# Patient Record
Sex: FEMALE | Race: WHITE | NOT HISPANIC OR LATINO | URBAN - METROPOLITAN AREA
[De-identification: names, ages, dates, MRNs, and addresses within clinical notes are randomized per-mention and may not be internally consistent; named-entity substitution may affect disease eponyms.]

---

## 2020-06-10 ENCOUNTER — LAB OUTSIDE AN ENCOUNTER (OUTPATIENT)
Dept: URBAN - METROPOLITAN AREA CLINIC 19 | Facility: CLINIC | Age: 65
End: 2020-06-10

## 2021-03-19 ENCOUNTER — ERX REFILL RESPONSE (OUTPATIENT)
Dept: URBAN - METROPOLITAN AREA CLINIC 19 | Facility: CLINIC | Age: 66
End: 2021-03-19

## 2021-03-19 RX ORDER — PANTOPRAZOLE SODIUM 40 MG/1
TAKE 1 TABLET (40 MG) BY ORAL ROUTE ONCE DAILY FOR 90 DAYS TABLET, DELAYED RELEASE ORAL
Qty: 90 | Refills: 3

## 2021-05-21 ENCOUNTER — TELEPHONE ENCOUNTER (OUTPATIENT)
Dept: URBAN - METROPOLITAN AREA CLINIC 19 | Facility: CLINIC | Age: 66
End: 2021-05-21

## 2021-05-21 RX ORDER — PANTOPRAZOLE SODIUM 40 MG/1
TAKE 1 TABLET (40 MG) BY ORAL ROUTE ONCE DAILY FOR 90 DAYS TABLET, DELAYED RELEASE ORAL
Qty: 90 | Refills: 3

## 2021-05-21 RX ORDER — FUROSEMIDE 40 MG/1
TAKE ONE TABLET BY MOUTH DAILY FOR 90 DAYS TABLET ORAL
Qty: 90 | Refills: 3
Start: 2020-05-04

## 2021-05-21 RX ORDER — SPIRONOLACTONE 100 MG/1
TAKE ONE TABLET BY MOUTH DAILY FOR 90 DAYS FOR 90 DAYS TABLET, FILM COATED ORAL
Qty: 90 | Refills: 3
Start: 2020-05-04

## 2021-07-30 ENCOUNTER — OFFICE VISIT (OUTPATIENT)
Dept: URBAN - METROPOLITAN AREA CLINIC 19 | Facility: CLINIC | Age: 66
End: 2021-07-30
Payer: MEDICARE

## 2021-07-30 ENCOUNTER — WEB ENCOUNTER (OUTPATIENT)
Dept: URBAN - METROPOLITAN AREA CLINIC 19 | Facility: CLINIC | Age: 66
End: 2021-07-30

## 2021-07-30 DIAGNOSIS — K70.30 ALCOHOLIC CIRRHOSIS: ICD-10-CM

## 2021-07-30 DIAGNOSIS — R18.8 ASCITES: ICD-10-CM

## 2021-07-30 DIAGNOSIS — Z86.010 HISTORY OF COLON POLYPS: ICD-10-CM

## 2021-07-30 PROCEDURE — 99214 OFFICE O/P EST MOD 30 MIN: CPT | Performed by: INTERNAL MEDICINE

## 2021-07-30 RX ORDER — GABAPENTIN 300 MG/1
TAKE 1 CAPSULE (300 MG) BY ORAL ROUTE 3 TIMES PER DAY CAPSULE ORAL
Qty: 0 | Refills: 0 | Status: ACTIVE | COMMUNITY
Start: 1900-01-01

## 2021-07-30 RX ORDER — QUETIAPINE FUMARATE 25 MG/1
TABLET, FILM COATED ORAL
Qty: 0 | Refills: 0 | Status: ACTIVE | COMMUNITY
Start: 1900-01-01

## 2021-07-30 RX ORDER — FUROSEMIDE 40 MG/1
TAKE ONE TABLET BY MOUTH DAILY FOR 90 DAYS TABLET ORAL ONCE A DAY
Qty: 90 | Refills: 3

## 2021-07-30 RX ORDER — HYDROCHLOROTHIAZIDE 25 MG/1
TAKE 1 TABLET (25 MG) BY ORAL ROUTE ONCE DAILY TABLET ORAL 1
Qty: 0 | Refills: 0 | Status: ACTIVE | COMMUNITY
Start: 1900-01-01

## 2021-07-30 RX ORDER — SPIRONOLACTONE 100 MG/1
TAKE ONE TABLET BY MOUTH DAILY FOR 90 DAYS FOR 90 DAYS TABLET, FILM COATED ORAL
Qty: 90 | Refills: 3 | Status: ACTIVE | COMMUNITY
Start: 2020-05-04

## 2021-07-30 RX ORDER — PANTOPRAZOLE SODIUM 40 MG/1
TAKE 1 TABLET (40 MG) BY ORAL ROUTE ONCE DAILY FOR 90 DAYS TABLET, DELAYED RELEASE ORAL
Qty: 90 | Refills: 3 | Status: ACTIVE | COMMUNITY

## 2021-07-30 RX ORDER — SPIRONOLACTONE 100 MG/1
TAKE ONE TABLET BY MOUTH DAILY FOR 90 DAYS FOR 90 DAYS TABLET, FILM COATED ORAL ONCE A DAY
Qty: 90 | Refills: 3

## 2021-07-30 RX ORDER — FLUTICASONE PROPIONATE 50 UG/1
SPRAY, METERED NASAL
Qty: 0 | Refills: 0 | Status: ACTIVE | COMMUNITY
Start: 1900-01-01

## 2021-07-30 RX ORDER — FUROSEMIDE 40 MG/1
TAKE ONE TABLET BY MOUTH DAILY FOR 90 DAYS TABLET ORAL
Qty: 90 | Refills: 3 | Status: ACTIVE | COMMUNITY
Start: 2020-05-04

## 2021-07-30 RX ORDER — TRAVOPROST 0.04 MG/ML
SOLUTION/ DROPS OPHTHALMIC
Qty: 0 | Refills: 0 | Status: ACTIVE | COMMUNITY
Start: 1900-01-01

## 2021-07-30 RX ORDER — ALBUTEROL SULFATE 108 UG/1
AEROSOL, METERED RESPIRATORY (INHALATION)
Qty: 0 | Refills: 0 | Status: ACTIVE | COMMUNITY
Start: 1900-01-01

## 2021-07-30 NOTE — HPI-TODAY'S VISIT:
10/13/16: Maddie Lisa returns to my clinic for reevalation of her liver issues. Due to concerns about liver cancer, she had a CT-guided liver biopsy performed on 8/29/16 that revealed fragments of benign liver with cirrhosis. I discussed with her the results of her biopsy and that the "masses" seen on imaging could be regenerative nodules, which are benign. She has had multiple paracenteses - she reports that she has had 3 in total. She complains of abdominal cramping. I discussed the results with her and the need to stop drinking alcohol. She states that she uses alcohol to relieve her abdominal pain; she has no interest in stopping at this time.  8/31/17: Patient returns after a long hiatus. She appears to have got her life together - she has been sober now for 6 months. 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 She has not given up smoking yet. Her only problem today is mild abdominal swelling. Her PCP agreed to give her a rx for spironolactone 50/furosemide 20. This is not a therapeutic dose. She needs HCC and variceal screening.  10/1/18: Patient returns for reevaluation - another year has gone by. Labs were unremarkable when they were drawn at last visit. RUQ ultrasound showed marked hepatomegaly but no cirrhosis. However, she had a prior liver biopsy that did show evidence of cirrhosis. She states that she takes her medications, avoids salt, and has not had any alcohol in a year. Complains of constipation followed by loose stools.  1/25/19: Patient returns for reevaluation of her biopsy-proven alcoholic cirrhosis. Patient has been sober for a year now, and she is doing very well. No complaints except sciatica. Spent a long time talking about her eye drops (for glaucoma) - sometimes they make her nauseated.  7/9/19: Patient presents for follow-up after recent [facility] hospitalization (5/21/19 - 5/25/19) for an upper GI bleed. She had actually come to the ER earlier on the morning of 5/21/19 for chest pain/muscle spasm and discharged, but her , Gwendolyn Childress, visited her later that day and told her to come back to the ER because she was vomiting blood. She had been having intractable nausea/vomiting with coffee-ground emesis. My colleague, Dr. Denver Dupree, performed her EGD on admission and found small esophageal varices and hematin. Repeat EGD found gastritis - she had been taking large amount of naproxen for chronic pain. Her liver function (based on labs and clinical exam) appears to be stable. No further bleeding since stopping all NSAIDs and taking Protonix. She maintains that she is still abstinent from alcohol. She has had some discomfort around her umbilicus with an obvious umbilical hernia.  11/8/19: Patient presents for reevaluation of her alcoholic cirrhosis. Doing well. On furosemide 40/spironolactone 100 daily. Had umbilical hernia repaired. No signs of bleeding. Minimal abdominal distension. Avoiding alcohol.  5/4/20: Patient presents for reevaluation of her alcoholic cirrhosis. Still on same dose of diuretics. Abstinent from alcohol. Due for HCC screening in July 2020. Last EGD was in May 2019 (see above). Had to have umbilical hernia repair revised - did well from a surgical standpoint but had a reaction to anesthesia. Protonix working well for heartburn.  7/30/21:  Patient returns for reevaluation of her alcoholic cirrhosis - she has maintained sobriety.  However, she is not always compliant with low sodium diet and diuretics, and she noticed recently that she was leaking at her umbilicus where she had prior surgery and revision.  She saw Dr. Worrell who told her that she needed to go back on her diuretics.  She has done well since then.  She is due for HCC surveillance and variceal surveillance.  Of note, her last RUQ ultrasound in June 2020 showed a right renal mass - she has since gone to a urologist who did further testing.  She was told that it was benign.

## 2022-03-16 ENCOUNTER — WEB ENCOUNTER (OUTPATIENT)
Dept: URBAN - METROPOLITAN AREA CLINIC 19 | Facility: CLINIC | Age: 67
End: 2022-03-16

## 2022-03-16 ENCOUNTER — LAB OUTSIDE AN ENCOUNTER (OUTPATIENT)
Dept: URBAN - METROPOLITAN AREA CLINIC 19 | Facility: CLINIC | Age: 67
End: 2022-03-16

## 2022-03-16 ENCOUNTER — OFFICE VISIT (OUTPATIENT)
Dept: URBAN - METROPOLITAN AREA CLINIC 19 | Facility: CLINIC | Age: 67
End: 2022-03-16
Payer: MEDICARE

## 2022-03-16 DIAGNOSIS — R11.0 NAUSEA: ICD-10-CM

## 2022-03-16 DIAGNOSIS — K70.30 ALCOHOLIC CIRRHOSIS: ICD-10-CM

## 2022-03-16 PROCEDURE — 99214 OFFICE O/P EST MOD 30 MIN: CPT | Performed by: NURSE PRACTITIONER

## 2022-03-16 RX ORDER — TRAVOPROST 0.04 MG/ML
SOLUTION/ DROPS OPHTHALMIC
Qty: 0 | Refills: 0 | Status: ACTIVE | COMMUNITY
Start: 1900-01-01

## 2022-03-16 RX ORDER — FUROSEMIDE 40 MG/1
TAKE ONE TABLET BY MOUTH DAILY FOR 90 DAYS TABLET ORAL ONCE A DAY
Qty: 90 | Refills: 3 | Status: ACTIVE | COMMUNITY

## 2022-03-16 RX ORDER — QUETIAPINE FUMARATE 25 MG/1
TABLET, FILM COATED ORAL
Qty: 0 | Refills: 0 | Status: ACTIVE | COMMUNITY
Start: 1900-01-01

## 2022-03-16 RX ORDER — FLUTICASONE PROPIONATE 50 UG/1
SPRAY, METERED NASAL
Qty: 0 | Refills: 0 | Status: ACTIVE | COMMUNITY
Start: 1900-01-01

## 2022-03-16 RX ORDER — ALBUTEROL SULFATE 108 UG/1
AEROSOL, METERED RESPIRATORY (INHALATION)
Qty: 0 | Refills: 0 | Status: ACTIVE | COMMUNITY
Start: 1900-01-01

## 2022-03-16 RX ORDER — PANTOPRAZOLE SODIUM 40 MG/1
TAKE 1 TABLET (40 MG) BY ORAL ROUTE ONCE DAILY FOR 90 DAYS TABLET, DELAYED RELEASE ORAL
Qty: 90 | Refills: 3 | Status: ACTIVE | COMMUNITY

## 2022-03-16 RX ORDER — SPIRONOLACTONE 100 MG/1
TAKE ONE TABLET BY MOUTH DAILY FOR 90 DAYS FOR 90 DAYS TABLET, FILM COATED ORAL ONCE A DAY
Qty: 90 | Refills: 3 | Status: ACTIVE | COMMUNITY

## 2022-03-16 RX ORDER — ONDANSETRON 4 MG/1
1 TABLET ON THE TONGUE AND ALLOW TO DISSOLVE TABLET, ORALLY DISINTEGRATING ORAL ONCE A DAY
Qty: 10 TABLET | Refills: 1 | OUTPATIENT
Start: 2022-03-16

## 2022-03-16 RX ORDER — HYDROCHLOROTHIAZIDE 25 MG/1
TAKE 1 TABLET (25 MG) BY ORAL ROUTE ONCE DAILY TABLET ORAL 1
Qty: 0 | Refills: 0 | Status: ACTIVE | COMMUNITY
Start: 1900-01-01

## 2022-03-16 RX ORDER — GABAPENTIN 300 MG/1
TAKE 1 CAPSULE (300 MG) BY ORAL ROUTE 3 TIMES PER DAY CAPSULE ORAL
Qty: 0 | Refills: 0 | Status: ACTIVE | COMMUNITY
Start: 1900-01-01

## 2022-03-16 NOTE — HPI-TODAY'S VISIT:
10/13/16: Maddie Lisa returns to my clinic for reevalation of her liver issues. Due to concerns about liver cancer, she had a CT-guided liver biopsy performed on 8/29/16 that revealed fragments of benign liver with cirrhosis. I discussed with her the results of her biopsy and that the "masses" seen on imaging could be regenerative nodules, which are benign. She has had multiple paracenteses - she reports that she has had 3 in total. She complains of abdominal cramping. I discussed the results with her and the need to stop drinking alcohol. She states that she uses alcohol to relieve her abdominal pain; she has no interest in stopping at this time.  8/31/17: Patient returns after a long hiatus. She appears to have got her life together - she has been sober now for 6 months. 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 She has not given up smoking yet. Her only problem today is mild abdominal swelling. Her PCP agreed to give her a rx for spironolactone 50/furosemide 20. This is not a therapeutic dose. She needs HCC and variceal screening.  10/1/18: Patient returns for reevaluation - another year has gone by. Labs were unremarkable when they were drawn at last visit. RUQ ultrasound showed marked hepatomegaly but no cirrhosis. However, she had a prior liver biopsy that did show evidence of cirrhosis. She states that she takes her medications, avoids salt, and has not had any alcohol in a year. Complains of constipation followed by loose stools.  1/25/19: Patient returns for reevaluation of her biopsy-proven alcoholic cirrhosis. Patient has been sober for a year now, and she is doing very well. No complaints except sciatica. Spent a long time talking about her eye drops (for glaucoma) - sometimes they make her nauseated.  7/9/19: Patient presents for follow-up after recent [facility] hospitalization (5/21/19 - 5/25/19) for an upper GI bleed. She had actually come to the ER earlier on the morning of 5/21/19 for chest pain/muscle spasm and discharged, but her , Gwendolyn Childress, visited her later that day and told her to come back to the ER because she was vomiting blood. She had been having intractable nausea/vomiting with coffee-ground emesis. My colleague, Dr. Denver Dupree, performed her EGD on admission and found small esophageal varices and hematin. Repeat EGD found gastritis - she had been taking large amount of naproxen for chronic pain. Her liver function (based on labs and clinical exam) appears to be stable. No further bleeding since stopping all NSAIDs and taking Protonix. She maintains that she is still abstinent from alcohol. She has had some discomfort around her umbilicus with an obvious umbilical hernia.  11/8/19: Patient presents for reevaluation of her alcoholic cirrhosis. Doing well. On furosemide 40/spironolactone 100 daily. Had umbilical hernia repaired. No signs of bleeding. Minimal abdominal distension. Avoiding alcohol.  5/4/20: Patient presents for reevaluation of her alcoholic cirrhosis. Still on same dose of diuretics. Abstinent from alcohol. Due for HCC screening in July 2020. Last EGD was in May 2019 (see above). Had to have umbilical hernia repair revised - did well from a surgical standpoint but had a reaction to anesthesia. Protonix working well for heartburn.  7/30/21:  Patient returns for reevaluation of her alcoholic cirrhosis - she has maintained sobriety.  However, she is not always compliant with low sodium diet and diuretics, and she noticed recently that she was leaking at her umbilicus where she had prior surgery and revision.  She saw Dr. Worrell who told her that she needed to go back on her diuretics.  She has done well since then.  She is due for HCC surveillance and variceal surveillance.  Of note, her last RUQ ultrasound in June 2020 showed a right renal mass - she has since gone to a urologist who did further testing.  She was told that it was benign.  3/16/22: Ms. Lisa is a 67-year-old female who presents today for ascites.  [facility] for nausea x1 month with vomiting.  Labs- WBC 6.41, Hgb 13.3, HCT 39.1, MCV 92.4, lipase 60, bilirubin 2.6, alk phos 122, , ALT 28, sodium 136.  CT ab/pelvis with IV contrast-suspected abscess in the periumbilical hernia.  Stigmata of cirrhosis with portal hypertension.  There are innumerable nodules noted throughout the liver which are not well assessed by CT.  Further assessment with nonemergent MRI of abdomen with and without contrast is suggested.  Colonic and duodenal diverticulosis.  Left renal stone.  General surgery was consulted for periumbilical abscess.  Patient left AMA.  Today, she reports her psychiatric medications were being adjusted.  At that time, she was having nausea and vomiting.  She reports that she was unable to take her diuretics.  Nausea and vomiting resolved a couple of weeks ago now, but she is requesting zofran. Patient is taking furosemide 40 mg daily and spironolactone 100 mg daily now.  She reports her ascites began last week.  She reports she is occasionally drinking alcohol now.  She reports she went a whole year without drinking alcohol.  She reports she is not following a low-sodium diet.  She denies jaundice, confusion, and lower extremity edema.  She denies cardiac/kidney disease, diabetes, blood thinners, and home O2.  Patient does have COPD, but does not see a pulmonologist.  Patient reports she did not do RUQ ultrasound, EGD, or labs Dr. Singleton ordered because she was doing well.

## 2022-03-16 NOTE — PHYSICAL EXAM GASTROINTESTINAL
Abdomen, soft, nontender, distended, no guarding or rigidity, no masses palpable, normal bowel sounds, Liver and Spleen, no hepatomegaly present, no hepatosplenomegaly,  Rectal, deferred

## 2022-03-17 LAB
A/G RATIO: 0.9
AFP, SERUM, TUMOR MARKER: 5.5
ALBUMIN: 3.5
ALKALINE PHOSPHATASE: 138
ALT (SGPT): 33
AST (SGOT): 76
BILIRUBIN, TOTAL: 2.3
BUN/CREATININE RATIO: 9
BUN: 6
CALCIUM: 8.7
CARBON DIOXIDE, TOTAL: 31
CHLORIDE: 90
CREATININE: 0.64
EGFR: 97
GLOBULIN, TOTAL: 4
GLUCOSE: 93
HEMATOCRIT: 40.3
HEMOGLOBIN: 13.4
INR: 1.2
MCH: 31
MCHC: 33.3
MCV: 93
NRBC: (no result)
PLATELETS: 245
POTASSIUM: (no result)
PROTEIN, TOTAL: 7.5
PROTHROMBIN TIME: 12.9
RBC: 4.32
RDW: 12.9
SODIUM: 140
WBC: 7.1

## 2022-03-21 ENCOUNTER — TELEPHONE ENCOUNTER (OUTPATIENT)
Dept: URBAN - METROPOLITAN AREA CLINIC 19 | Facility: CLINIC | Age: 67
End: 2022-03-21

## 2022-03-22 ENCOUNTER — TELEPHONE ENCOUNTER (OUTPATIENT)
Dept: URBAN - METROPOLITAN AREA CLINIC 19 | Facility: CLINIC | Age: 67
End: 2022-03-22

## 2022-04-18 ENCOUNTER — TELEPHONE ENCOUNTER (OUTPATIENT)
Dept: URBAN - METROPOLITAN AREA CLINIC 19 | Facility: CLINIC | Age: 67
End: 2022-04-18

## 2022-04-22 ENCOUNTER — ERX REFILL RESPONSE (OUTPATIENT)
Dept: URBAN - METROPOLITAN AREA CLINIC 19 | Facility: CLINIC | Age: 67
End: 2022-04-22

## 2022-04-22 RX ORDER — ONDANSETRON 4 MG/1
1 TABLET ON THE TONGUE AND ALLOW TO DISSOLVE TABLET, ORALLY DISINTEGRATING ORAL ONCE A DAY
Qty: 10 TABLET | Refills: 1 | OUTPATIENT

## 2022-04-22 RX ORDER — ONDANSETRON 4 MG/1
1 TABLET ON THE TONGUE AND ALLOW TO DISSOLVE ORALLY ONCE A DAY 10 DAYS TABLET, ORALLY DISINTEGRATING ORAL
Qty: 10 TABLET | Refills: 5 | OUTPATIENT

## 2022-05-13 ENCOUNTER — TELEPHONE ENCOUNTER (OUTPATIENT)
Dept: URBAN - METROPOLITAN AREA CLINIC 19 | Facility: CLINIC | Age: 67
End: 2022-05-13

## 2022-05-13 ENCOUNTER — OFFICE VISIT (OUTPATIENT)
Dept: URBAN - METROPOLITAN AREA MEDICAL CENTER 25 | Facility: MEDICAL CENTER | Age: 67
End: 2022-05-13
Payer: MEDICARE

## 2022-05-13 DIAGNOSIS — K74.60 ADVANCED CIRRHOSIS: ICD-10-CM

## 2022-05-13 DIAGNOSIS — K29.60 ADENOPAPILLOMATOSIS GASTRICA: ICD-10-CM

## 2022-05-13 DIAGNOSIS — I85.10 ESOPH VARICE OTHER DIS: ICD-10-CM

## 2022-05-13 PROCEDURE — 43244 EGD VARICES LIGATION: CPT | Performed by: INTERNAL MEDICINE

## 2022-05-13 PROCEDURE — 43251 EGD REMOVE LESION SNARE: CPT | Performed by: INTERNAL MEDICINE

## 2022-05-13 RX ORDER — PANTOPRAZOLE SODIUM 40 MG/1
TAKE 1 TABLET (40 MG) BY ORAL ROUTE ONCE DAILY FOR 90 DAYS TABLET, DELAYED RELEASE ORAL
Qty: 90 | Refills: 3 | Status: ACTIVE | COMMUNITY

## 2022-05-13 RX ORDER — FLUTICASONE PROPIONATE 50 UG/1
SPRAY, METERED NASAL
Qty: 0 | Refills: 0 | Status: ACTIVE | COMMUNITY
Start: 1900-01-01

## 2022-05-13 RX ORDER — QUETIAPINE FUMARATE 25 MG/1
TABLET, FILM COATED ORAL
Qty: 0 | Refills: 0 | Status: ACTIVE | COMMUNITY
Start: 1900-01-01

## 2022-05-13 RX ORDER — HYDROCHLOROTHIAZIDE 25 MG/1
TAKE 1 TABLET (25 MG) BY ORAL ROUTE ONCE DAILY TABLET ORAL 1
Qty: 0 | Refills: 0 | Status: ACTIVE | COMMUNITY
Start: 1900-01-01

## 2022-05-13 RX ORDER — GABAPENTIN 300 MG/1
TAKE 1 CAPSULE (300 MG) BY ORAL ROUTE 3 TIMES PER DAY CAPSULE ORAL
Qty: 0 | Refills: 0 | Status: ACTIVE | COMMUNITY
Start: 1900-01-01

## 2022-05-13 RX ORDER — ALBUTEROL SULFATE 108 UG/1
AEROSOL, METERED RESPIRATORY (INHALATION)
Qty: 0 | Refills: 0 | Status: ACTIVE | COMMUNITY
Start: 1900-01-01

## 2022-05-13 RX ORDER — SPIRONOLACTONE 100 MG/1
TAKE ONE TABLET BY MOUTH DAILY FOR 90 DAYS FOR 90 DAYS TABLET, FILM COATED ORAL ONCE A DAY
Qty: 90 | Refills: 3 | Status: ACTIVE | COMMUNITY

## 2022-05-13 RX ORDER — ONDANSETRON 4 MG/1
1 TABLET ON THE TONGUE AND ALLOW TO DISSOLVE ORALLY ONCE A DAY 10 DAYS TABLET, ORALLY DISINTEGRATING ORAL
Qty: 10 TABLET | Refills: 5 | Status: ACTIVE | COMMUNITY

## 2022-05-13 RX ORDER — FUROSEMIDE 40 MG/1
TAKE ONE TABLET BY MOUTH DAILY FOR 90 DAYS TABLET ORAL ONCE A DAY
Qty: 90 | Refills: 3 | Status: ACTIVE | COMMUNITY

## 2022-05-13 RX ORDER — TRAVOPROST 0.04 MG/ML
SOLUTION/ DROPS OPHTHALMIC
Qty: 0 | Refills: 0 | Status: ACTIVE | COMMUNITY
Start: 1900-01-01

## 2022-06-03 ENCOUNTER — ERX REFILL RESPONSE (OUTPATIENT)
Dept: URBAN - METROPOLITAN AREA CLINIC 19 | Facility: CLINIC | Age: 67
End: 2022-06-03

## 2022-06-03 RX ORDER — PANTOPRAZOLE SODIUM 40 MG/1
TAKE 1 TABLET (40 MG) BY ORAL ROUTE ONCE DAILY FOR 90 DAYS TABLET, DELAYED RELEASE ORAL
Qty: 90 | Refills: 3 | OUTPATIENT

## 2022-06-03 RX ORDER — PANTOPRAZOLE SODIUM 40 MG/1
TAKE ONE TABLET BY MOUTH ONCE DAILY FOR 90 DAYS TABLET, DELAYED RELEASE ORAL
Qty: 90 TABLET | Refills: 4 | OUTPATIENT

## 2022-07-08 ENCOUNTER — OFFICE VISIT (OUTPATIENT)
Dept: URBAN - METROPOLITAN AREA MEDICAL CENTER 25 | Facility: MEDICAL CENTER | Age: 67
End: 2022-07-08
Payer: MEDICARE

## 2022-07-08 DIAGNOSIS — K74.60 ADVANCED CIRRHOSIS: ICD-10-CM

## 2022-07-08 DIAGNOSIS — I85.10 ESOPH VARICE OTHER DIS: ICD-10-CM

## 2022-07-08 PROCEDURE — 43235 EGD DIAGNOSTIC BRUSH WASH: CPT | Performed by: INTERNAL MEDICINE

## 2022-07-08 RX ORDER — PANTOPRAZOLE SODIUM 40 MG/1
TAKE ONE TABLET BY MOUTH ONCE DAILY FOR 90 DAYS TABLET, DELAYED RELEASE ORAL
Qty: 90 TABLET | Refills: 4 | Status: ACTIVE | COMMUNITY

## 2022-07-08 RX ORDER — SPIRONOLACTONE 100 MG/1
TAKE ONE TABLET BY MOUTH DAILY FOR 90 DAYS FOR 90 DAYS TABLET, FILM COATED ORAL ONCE A DAY
Qty: 90 | Refills: 3 | Status: ACTIVE | COMMUNITY

## 2022-07-08 RX ORDER — FUROSEMIDE 40 MG/1
TAKE ONE TABLET BY MOUTH DAILY FOR 90 DAYS TABLET ORAL ONCE A DAY
Qty: 90 | Refills: 3 | Status: ACTIVE | COMMUNITY

## 2022-07-08 RX ORDER — QUETIAPINE FUMARATE 25 MG/1
TABLET, FILM COATED ORAL
Qty: 0 | Refills: 0 | Status: ACTIVE | COMMUNITY
Start: 1900-01-01

## 2022-07-08 RX ORDER — GABAPENTIN 300 MG/1
TAKE 1 CAPSULE (300 MG) BY ORAL ROUTE 3 TIMES PER DAY CAPSULE ORAL
Qty: 0 | Refills: 0 | Status: ACTIVE | COMMUNITY
Start: 1900-01-01

## 2022-07-08 RX ORDER — ONDANSETRON 4 MG/1
1 TABLET ON THE TONGUE AND ALLOW TO DISSOLVE ORALLY ONCE A DAY 10 DAYS TABLET, ORALLY DISINTEGRATING ORAL
Qty: 10 TABLET | Refills: 5 | Status: ACTIVE | COMMUNITY

## 2022-07-08 RX ORDER — FLUTICASONE PROPIONATE 50 UG/1
SPRAY, METERED NASAL
Qty: 0 | Refills: 0 | Status: ACTIVE | COMMUNITY
Start: 1900-01-01

## 2022-07-08 RX ORDER — TRAVOPROST 0.04 MG/ML
SOLUTION/ DROPS OPHTHALMIC
Qty: 0 | Refills: 0 | Status: ACTIVE | COMMUNITY
Start: 1900-01-01

## 2022-07-08 RX ORDER — HYDROCHLOROTHIAZIDE 25 MG/1
TAKE 1 TABLET (25 MG) BY ORAL ROUTE ONCE DAILY TABLET ORAL 1
Qty: 0 | Refills: 0 | Status: ACTIVE | COMMUNITY
Start: 1900-01-01

## 2022-07-08 RX ORDER — ALBUTEROL SULFATE 108 UG/1
AEROSOL, METERED RESPIRATORY (INHALATION)
Qty: 0 | Refills: 0 | Status: ACTIVE | COMMUNITY
Start: 1900-01-01

## 2022-11-03 ENCOUNTER — TELEPHONE ENCOUNTER (OUTPATIENT)
Dept: URBAN - METROPOLITAN AREA CLINIC 19 | Facility: CLINIC | Age: 67
End: 2022-11-03

## 2022-11-23 ENCOUNTER — TELEPHONE ENCOUNTER (OUTPATIENT)
Dept: URBAN - METROPOLITAN AREA CLINIC 128 | Facility: CLINIC | Age: 67
End: 2022-11-23

## 2022-12-02 ENCOUNTER — OFFICE VISIT (OUTPATIENT)
Dept: URBAN - METROPOLITAN AREA CLINIC 19 | Facility: CLINIC | Age: 67
End: 2022-12-02
Payer: MEDICARE

## 2022-12-02 VITALS
SYSTOLIC BLOOD PRESSURE: 132 MMHG | TEMPERATURE: 98.4 F | WEIGHT: 129.2 LBS | BODY MASS INDEX: 26.04 KG/M2 | DIASTOLIC BLOOD PRESSURE: 74 MMHG | HEIGHT: 59 IN

## 2022-12-02 DIAGNOSIS — K30 FUNCTIONAL DYSPEPSIA: ICD-10-CM

## 2022-12-02 DIAGNOSIS — R13.12 OROPHARYNGEAL DYSPHAGIA: ICD-10-CM

## 2022-12-02 DIAGNOSIS — K70.30 ALCOHOLIC CIRRHOSIS: ICD-10-CM

## 2022-12-02 DIAGNOSIS — Z86.010 HISTORY OF COLON POLYPS: ICD-10-CM

## 2022-12-02 DIAGNOSIS — R18.8 ASCITES: ICD-10-CM

## 2022-12-02 DIAGNOSIS — I85.00 ESOPHAGEAL VARICES: ICD-10-CM

## 2022-12-02 PROBLEM — 3696007 FUNCTIONAL DYSPEPSIA: Status: ACTIVE | Noted: 2022-12-02

## 2022-12-02 PROCEDURE — 99214 OFFICE O/P EST MOD 30 MIN: CPT | Performed by: INTERNAL MEDICINE

## 2022-12-02 RX ORDER — FUROSEMIDE 40 MG/1
TAKE ONE TABLET BY MOUTH DAILY FOR 90 DAYS TABLET ORAL ONCE A DAY
Qty: 90 | Refills: 3

## 2022-12-02 RX ORDER — ONDANSETRON 4 MG/1
1 TABLET ON THE TONGUE AND ALLOW TO DISSOLVE ORALLY ONCE A DAY 10 DAYS TABLET, ORALLY DISINTEGRATING ORAL
Qty: 10 TABLET | Refills: 5 | Status: ACTIVE | COMMUNITY

## 2022-12-02 RX ORDER — SPIRONOLACTONE 100 MG/1
TAKE ONE TABLET BY MOUTH DAILY FOR 90 DAYS FOR 90 DAYS TABLET, FILM COATED ORAL ONCE A DAY
Qty: 90 | Refills: 3

## 2022-12-02 RX ORDER — HYDROCHLOROTHIAZIDE 25 MG/1
TAKE 1 TABLET (25 MG) BY ORAL ROUTE ONCE DAILY TABLET ORAL 1
Qty: 0 | Refills: 0 | Status: ACTIVE | COMMUNITY
Start: 1900-01-01

## 2022-12-02 RX ORDER — SPIRONOLACTONE 100 MG/1
TAKE ONE TABLET BY MOUTH DAILY FOR 90 DAYS FOR 90 DAYS TABLET, FILM COATED ORAL ONCE A DAY
Qty: 90 | Refills: 3 | Status: ACTIVE | COMMUNITY

## 2022-12-02 RX ORDER — PANTOPRAZOLE SODIUM 40 MG/1
TAKE ONE TABLET BY MOUTH ONCE DAILY FOR 90 DAYS TABLET, DELAYED RELEASE ORAL
Qty: 90 TABLET | Refills: 4 | Status: ACTIVE | COMMUNITY

## 2022-12-02 RX ORDER — PANTOPRAZOLE SODIUM 40 MG/1
1 TABLET TABLET, DELAYED RELEASE ORAL ONCE A DAY
Qty: 90 TABLET | Refills: 3 | OUTPATIENT
Start: 2022-12-02

## 2022-12-02 RX ORDER — FUROSEMIDE 40 MG/1
TAKE ONE TABLET BY MOUTH DAILY FOR 90 DAYS TABLET ORAL ONCE A DAY
Qty: 90 | Refills: 3 | Status: ACTIVE | COMMUNITY

## 2022-12-02 RX ORDER — FLUTICASONE PROPIONATE 50 UG/1
SPRAY, METERED NASAL
Qty: 0 | Refills: 0 | Status: ACTIVE | COMMUNITY
Start: 1900-01-01

## 2022-12-02 RX ORDER — GABAPENTIN 300 MG/1
TAKE 1 CAPSULE (300 MG) BY ORAL ROUTE 3 TIMES PER DAY CAPSULE ORAL
Qty: 0 | Refills: 0 | Status: ACTIVE | COMMUNITY
Start: 1900-01-01

## 2022-12-02 RX ORDER — TRAVOPROST 0.04 MG/ML
SOLUTION/ DROPS OPHTHALMIC
Qty: 0 | Refills: 0 | Status: ACTIVE | COMMUNITY
Start: 1900-01-01

## 2022-12-02 RX ORDER — ALBUTEROL SULFATE 108 UG/1
AEROSOL, METERED RESPIRATORY (INHALATION)
Qty: 0 | Refills: 0 | Status: ACTIVE | COMMUNITY
Start: 1900-01-01

## 2022-12-02 RX ORDER — QUETIAPINE FUMARATE 25 MG/1
TABLET, FILM COATED ORAL
Qty: 0 | Refills: 0 | Status: ACTIVE | COMMUNITY
Start: 1900-01-01

## 2022-12-02 NOTE — HPI-TODAY'S VISIT:
10/13/16: Maddie Lisa returns to my clinic for reevalation of her liver issues. Due to concerns about liver cancer, she had a CT-guided liver biopsy performed on 8/29/16 that revealed fragments of benign liver with cirrhosis. I discussed with her the results of her biopsy and that the "masses" seen on imaging could be regenerative nodules, which are benign. She has had multiple paracenteses - she reports that she has had 3 in total. She complains of abdominal cramping. I discussed the results with her and the need to stop drinking alcohol. She states that she uses alcohol to relieve her abdominal pain; she has no interest in stopping at this time.  8/31/17: Patient returns after a long hiatus. She appears to have got her life together - she has been sober now for 6 months. 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 She has not given up smoking yet. Her only problem today is mild abdominal swelling. Her PCP agreed to give her a rx for spironolactone 50/furosemide 20. This is not a therapeutic dose. She needs HCC and variceal screening.  10/1/18: Patient returns for reevaluation - another year has gone by. Labs were unremarkable when they were drawn at last visit. RUQ ultrasound showed marked hepatomegaly but no cirrhosis. However, she had a prior liver biopsy that did show evidence of cirrhosis. She states that she takes her medications, avoids salt, and has not had any alcohol in a year. Complains of constipation followed by loose stools.  1/25/19: Patient returns for reevaluation of her biopsy-proven alcoholic cirrhosis. Patient has been sober for a year now, and she is doing very well. No complaints except sciatica. Spent a long time talking about her eye drops (for glaucoma) - sometimes they make her nauseated.  7/9/19: Patient presents for follow-up after recent [facility] hospitalization (5/21/19 - 5/25/19) for an upper GI bleed. She had actually come to the ER earlier on the morning of 5/21/19 for chest pain/muscle spasm and discharged, but her , Gwendolyn Childress, visited her later that day and told her to come back to the ER because she was vomiting blood. She had been having intractable nausea/vomiting with coffee-ground emesis. My colleague, Dr. Denver Dupree, performed her EGD on admission and found small esophageal varices and hematin. Repeat EGD found gastritis - she had been taking large amount of naproxen for chronic pain. Her liver function (based on labs and clinical exam) appears to be stable. No further bleeding since stopping all NSAIDs and taking Protonix. She maintains that she is still abstinent from alcohol. She has had some discomfort around her umbilicus with an obvious umbilical hernia.  11/8/19: Patient presents for reevaluation of her alcoholic cirrhosis. Doing well. On furosemide 40/spironolactone 100 daily. Had umbilical hernia repaired. No signs of bleeding. Minimal abdominal distension. Avoiding alcohol.  5/4/20: Patient presents for reevaluation of her alcoholic cirrhosis. Still on same dose of diuretics. Abstinent from alcohol. Due for HCC screening in July 2020. Last EGD was in May 2019 (see above). Had to have umbilical hernia repair revised - did well from a surgical standpoint but had a reaction to anesthesia. Protonix working well for heartburn.  7/30/21:  Patient returns for reevaluation of her alcoholic cirrhosis - she has maintained sobriety.  However, she is not always compliant with low sodium diet and diuretics, and she noticed recently that she was leaking at her umbilicus where she had prior surgery and revision.  She saw Dr. Worrell who told her that she needed to go back on her diuretics.  She has done well since then.  She is due for HCC surveillance and variceal surveillance.  Of note, her last RUQ ultrasound in June 2020 showed a right renal mass - she has since gone to a urologist who did further testing.  She was told that it was benign.  3/16/22 (raissa Henao, JON): Ms. Lisa is a 67-year-old female who presents today for ascites.  [facility] for nausea x1 month with vomiting.  Labs- WBC 6.41, Hgb 13.3, HCT 39.1, MCV 92.4, lipase 60, bilirubin 2.6, alk phos 122, , ALT 28, sodium 136.  CT ab/pelvis with IV contrast-suspected abscess in the periumbilical hernia.  Stigmata of cirrhosis with portal hypertension.  There are innumerable nodules noted throughout the liver which are not well assessed by CT.  Further assessment with nonemergent MRI of abdomen with and without contrast is suggested.  Colonic and duodenal diverticulosis.  Left renal stone.  General surgery was consulted for periumbilical abscess.  Patient left AMA.  Today, she reports her psychiatric medications were being adjusted.  At that time, she was having nausea and vomiting.  She reports that she was unable to take her diuretics.  Nausea and vomiting resolved a couple of weeks ago now, but she is requesting zofran. Patient is taking furosemide 40 mg daily and spironolactone 100 mg daily now.  She reports her ascites began last week.  She reports she is occasionally drinking alcohol now.  She reports she went a whole year without drinking alcohol.  She reports she is not following a low-sodium diet.  She denies jaundice, confusion, and lower extremity edema.  She denies cardiac/kidney disease, diabetes, blood thinners, and home O2.  Patient does have COPD, but does not see a pulmonologist.  Patient reports she did not do RUQ ultrasound, EGD, or labs Dr. Singleton ordered because she was doing well.  12/2/22: Patient presents for urgent consultation. She had a CT abdomen/pelvis with and without contrast to evaluate her urologic system, and she was told that she needed to follow up with me "urgently". The CT scan was done on 11/23/22 - there were no significant changes from prior scans. She still has cirrhosis with splenomegaly (due to portal hypertension). No liver masses noted. Labs from 3/16/22 showed bilirubin 2.3, AST 76, , and no other significant abnormalities. (She had normal creatinine, sodium, INR, platelets, and AFP).  However, she complains today that her abdomen is more distended, and her umbilical hernia has returned. She denies any alcohol use. She admits to non-compliance with low-sodium diet. She likes olives. Interestingly enough, her CT scan report made no mention of ascites or hernia.

## 2022-12-02 NOTE — PHYSICAL EXAM GASTROINTESTINAL
Abdomen , soft, nontender, moderately distended , no guarding or rigidity , no masses palpable , normal bowel sounds , + umbilical hernia (reducible) Liver and Spleen , no hepatomegaly present , no hepatosplenomegaly , liver nontender , spleen not palpable Rectal deferred

## 2022-12-05 LAB
A/G RATIO: 0.9
AFP, SERUM, TUMOR MARKER: 7.8
ALBUMIN: 3.7
ALKALINE PHOSPHATASE: 151
ALT (SGPT): 25
AST (SGOT): 49
BILIRUBIN, TOTAL: 1.4
BUN/CREATININE RATIO: (no result)
BUN: 13
CALCIUM: 9.3
CARBON DIOXIDE, TOTAL: 24
CHLORIDE: 103
CREATININE: 0.66
EGFR: 97
GLOBULIN, TOTAL: 3.9
GLUCOSE: 74
HEMATOCRIT: 34.7
HEMOGLOBIN: 11.3
INR: 1.2
MCH: 28.7
MCHC: 32.6
MCV: 88.1
MPV: 10
PLATELET COUNT: 256
POTASSIUM: 3.4
PROTEIN, TOTAL: 7.6
PT: 11.9
RDW: 13.9
RED BLOOD CELL COUNT: 3.94
SODIUM: 138
WHITE BLOOD CELL COUNT: 7

## 2022-12-07 ENCOUNTER — TELEPHONE ENCOUNTER (OUTPATIENT)
Dept: URBAN - METROPOLITAN AREA CLINIC 19 | Facility: CLINIC | Age: 67
End: 2022-12-07

## 2022-12-07 PROBLEM — 166561008 ALPHA-FETOPROTEIN RAISED: Status: ACTIVE | Noted: 2022-12-07

## 2023-01-05 ENCOUNTER — TELEPHONE ENCOUNTER (OUTPATIENT)
Dept: URBAN - METROPOLITAN AREA CLINIC 19 | Facility: CLINIC | Age: 68
End: 2023-01-05

## 2023-01-06 ENCOUNTER — LAB OUTSIDE AN ENCOUNTER (OUTPATIENT)
Dept: URBAN - METROPOLITAN AREA CLINIC 19 | Facility: CLINIC | Age: 68
End: 2023-01-06

## 2023-01-06 ENCOUNTER — OFFICE VISIT (OUTPATIENT)
Dept: URBAN - METROPOLITAN AREA CLINIC 19 | Facility: CLINIC | Age: 68
End: 2023-01-06

## 2023-01-06 LAB
ABSOLUTE BASOPHIL COUNT: 0.1
ABSOLUTE EOSINOPHIL COUNT: 0.05
ABSOLUTE IMMATURE GRANULOCYTE  COUNT: 0.03
ABSOLUTE LYMPHOCYTE COUNT: 1.24
ABSOLUTE MONOCYTE COUNT: 0.71
ABSOLUTE NEUTROPHIL COUNT (ANC): 3.33
ABSOLUTE NRBC  COUNT: 0
BASOPHIL AUTO: 2
EOS AUTO: 1
HCT: 33.1
HGB: 10.7
IMMATURE GRANULOCYTES AUTO: 0.5
INR: 1.3
LYMPH AUTO: 23
MCH: 27.7
MCHC: 32.3
MCV: 85.8
MONO AUTO: 13
MPV: 9.4
NEUTRO AUTO: 61
NRBC AUTO: 0
PARTIAL THROMBOPLASTIN TIME: 39.5
PERFORMING LAB: (no result)
PLATELETS: 211
PT: 16.6
RBC: 3.86
RDW: 15.2
WBC: 5.5

## 2023-01-06 RX ORDER — HYDROCHLOROTHIAZIDE 25 MG/1
TAKE 1 TABLET (25 MG) BY ORAL ROUTE ONCE DAILY TABLET ORAL 1
Qty: 0 | Refills: 0 | Status: ACTIVE | COMMUNITY
Start: 1900-01-01

## 2023-01-06 RX ORDER — FUROSEMIDE 40 MG/1
TAKE ONE TABLET BY MOUTH DAILY FOR 90 DAYS TABLET ORAL ONCE A DAY
Qty: 90 | Refills: 3 | Status: ACTIVE | COMMUNITY

## 2023-01-06 RX ORDER — PANTOPRAZOLE SODIUM 40 MG/1
1 TABLET TABLET, DELAYED RELEASE ORAL ONCE A DAY
Qty: 90 TABLET | Refills: 3 | Status: ACTIVE | COMMUNITY
Start: 2022-12-02

## 2023-01-06 RX ORDER — SPIRONOLACTONE 100 MG/1
TAKE ONE TABLET BY MOUTH DAILY FOR 90 DAYS FOR 90 DAYS TABLET, FILM COATED ORAL ONCE A DAY
Qty: 90 | Refills: 3 | Status: ACTIVE | COMMUNITY

## 2023-01-06 RX ORDER — TRAVOPROST 0.04 MG/ML
SOLUTION/ DROPS OPHTHALMIC
Qty: 0 | Refills: 0 | Status: ACTIVE | COMMUNITY
Start: 1900-01-01

## 2023-01-06 RX ORDER — QUETIAPINE FUMARATE 25 MG/1
TABLET, FILM COATED ORAL
Qty: 0 | Refills: 0 | Status: ACTIVE | COMMUNITY
Start: 1900-01-01

## 2023-01-06 RX ORDER — GABAPENTIN 300 MG/1
TAKE 1 CAPSULE (300 MG) BY ORAL ROUTE 3 TIMES PER DAY CAPSULE ORAL
Qty: 0 | Refills: 0 | Status: ACTIVE | COMMUNITY
Start: 1900-01-01

## 2023-01-06 RX ORDER — ONDANSETRON 4 MG/1
1 TABLET ON THE TONGUE AND ALLOW TO DISSOLVE ORALLY ONCE A DAY 10 DAYS TABLET, ORALLY DISINTEGRATING ORAL
Qty: 10 TABLET | Refills: 5 | Status: ACTIVE | COMMUNITY

## 2023-01-06 RX ORDER — FLUTICASONE PROPIONATE 50 UG/1
SPRAY, METERED NASAL
Qty: 0 | Refills: 0 | Status: ACTIVE | COMMUNITY
Start: 1900-01-01

## 2023-01-06 RX ORDER — PANTOPRAZOLE SODIUM 40 MG/1
TAKE ONE TABLET BY MOUTH ONCE DAILY FOR 90 DAYS TABLET, DELAYED RELEASE ORAL
Qty: 90 TABLET | Refills: 4 | Status: ACTIVE | COMMUNITY

## 2023-01-06 RX ORDER — ALBUTEROL SULFATE 108 UG/1
AEROSOL, METERED RESPIRATORY (INHALATION)
Qty: 0 | Refills: 0 | Status: ACTIVE | COMMUNITY
Start: 1900-01-01

## 2023-03-03 ENCOUNTER — OFFICE VISIT (OUTPATIENT)
Dept: URBAN - METROPOLITAN AREA CLINIC 19 | Facility: CLINIC | Age: 68
End: 2023-03-03
Payer: MEDICARE

## 2023-03-03 VITALS
TEMPERATURE: 98 F | SYSTOLIC BLOOD PRESSURE: 140 MMHG | BODY MASS INDEX: 26.73 KG/M2 | DIASTOLIC BLOOD PRESSURE: 80 MMHG | HEIGHT: 59 IN | WEIGHT: 132.6 LBS

## 2023-03-03 DIAGNOSIS — I85.00 ESOPHAGEAL VARICES: ICD-10-CM

## 2023-03-03 DIAGNOSIS — R13.12 OROPHARYNGEAL DYSPHAGIA: ICD-10-CM

## 2023-03-03 DIAGNOSIS — K70.30 ALCOHOLIC CIRRHOSIS: ICD-10-CM

## 2023-03-03 DIAGNOSIS — R18.8 ASCITES: ICD-10-CM

## 2023-03-03 DIAGNOSIS — K42.9 UMBILICAL HERNIA: ICD-10-CM

## 2023-03-03 DIAGNOSIS — Z86.010 HISTORY OF COLON POLYPS: ICD-10-CM

## 2023-03-03 PROBLEM — 71457002 OROPHARYNGEAL DYSPHAGIA: Status: ACTIVE | Noted: 2022-12-02

## 2023-03-03 PROBLEM — 28670008 ESOPHAGEAL VARICES: Status: ACTIVE | Noted: 2022-05-13

## 2023-03-03 PROCEDURE — 99214 OFFICE O/P EST MOD 30 MIN: CPT | Performed by: INTERNAL MEDICINE

## 2023-03-03 RX ORDER — PANTOPRAZOLE SODIUM 40 MG/1
TAKE ONE TABLET BY MOUTH ONCE DAILY FOR 90 DAYS TABLET, DELAYED RELEASE ORAL
Qty: 90 TABLET | Refills: 4 | Status: ACTIVE | COMMUNITY

## 2023-03-03 RX ORDER — FLUTICASONE PROPIONATE 50 UG/1
SPRAY, METERED NASAL
Qty: 0 | Refills: 0 | Status: ACTIVE | COMMUNITY
Start: 1900-01-01

## 2023-03-03 RX ORDER — SPIRONOLACTONE 100 MG/1
TAKE ONE TABLET BY MOUTH DAILY FOR 90 DAYS FOR 90 DAYS TABLET, FILM COATED ORAL ONCE A DAY
Qty: 90 | Refills: 3 | Status: ACTIVE | COMMUNITY

## 2023-03-03 RX ORDER — PANTOPRAZOLE SODIUM 40 MG/1
1 TABLET TABLET, DELAYED RELEASE ORAL ONCE A DAY
Qty: 90 TABLET | Refills: 3 | Status: ACTIVE | COMMUNITY
Start: 2022-12-02

## 2023-03-03 RX ORDER — TRAVOPROST 0.04 MG/ML
SOLUTION/ DROPS OPHTHALMIC
Qty: 0 | Refills: 0 | Status: ACTIVE | COMMUNITY
Start: 1900-01-01

## 2023-03-03 RX ORDER — ONDANSETRON 4 MG/1
1 TABLET ON THE TONGUE AND ALLOW TO DISSOLVE ORALLY ONCE A DAY 10 DAYS TABLET, ORALLY DISINTEGRATING ORAL
Qty: 10 TABLET | Refills: 5 | Status: ACTIVE | COMMUNITY

## 2023-03-03 RX ORDER — GABAPENTIN 300 MG/1
TAKE 1 CAPSULE (300 MG) BY ORAL ROUTE 3 TIMES PER DAY CAPSULE ORAL
Qty: 0 | Refills: 0 | Status: ACTIVE | COMMUNITY
Start: 1900-01-01

## 2023-03-03 RX ORDER — QUETIAPINE FUMARATE 25 MG/1
TABLET, FILM COATED ORAL
Qty: 0 | Refills: 0 | Status: ACTIVE | COMMUNITY
Start: 1900-01-01

## 2023-03-03 RX ORDER — FUROSEMIDE 40 MG/1
TAKE ONE TABLET BY MOUTH DAILY FOR 90 DAYS TABLET ORAL ONCE A DAY
Qty: 90 | Refills: 3 | Status: ACTIVE | COMMUNITY

## 2023-03-03 RX ORDER — HYDROCHLOROTHIAZIDE 25 MG/1
TAKE 1 TABLET (25 MG) BY ORAL ROUTE ONCE DAILY TABLET ORAL 1
Qty: 0 | Refills: 0 | Status: ACTIVE | COMMUNITY
Start: 1900-01-01

## 2023-03-03 RX ORDER — ALBUTEROL SULFATE 108 UG/1
AEROSOL, METERED RESPIRATORY (INHALATION)
Qty: 0 | Refills: 0 | Status: ACTIVE | COMMUNITY
Start: 1900-01-01

## 2023-03-03 NOTE — HPI-TODAY'S VISIT:
10/13/16: Maddie Lisa returns to my clinic for reevalation of her liver issues. Due to concerns about liver cancer, she had a CT-guided liver biopsy performed on 8/29/16 that revealed fragments of benign liver with cirrhosis. I discussed with her the results of her biopsy and that the "masses" seen on imaging could be regenerative nodules, which are benign. She has had multiple paracenteses - she reports that she has had 3 in total. She complains of abdominal cramping. I discussed the results with her and the need to stop drinking alcohol. She states that she uses alcohol to relieve her abdominal pain; she has no interest in stopping at this time.  8/31/17: Patient returns after a long hiatus. She appears to have got her life together - she has been sober now for 6 months. 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 She has not given up smoking yet. Her only problem today is mild abdominal swelling. Her PCP agreed to give her a rx for spironolactone 50/furosemide 20. This is not a therapeutic dose. She needs HCC and variceal screening.  10/1/18: Patient returns for reevaluation - another year has gone by. Labs were unremarkable when they were drawn at last visit. RUQ ultrasound showed marked hepatomegaly but no cirrhosis. However, she had a prior liver biopsy that did show evidence of cirrhosis. She states that she takes her medications, avoids salt, and has not had any alcohol in a year. Complains of constipation followed by loose stools.  1/25/19: Patient returns for reevaluation of her biopsy-proven alcoholic cirrhosis. Patient has been sober for a year now, and she is doing very well. No complaints except sciatica. Spent a long time talking about her eye drops (for glaucoma) - sometimes they make her nauseated.  7/9/19: Patient presents for follow-up after recent [facility] hospitalization (5/21/19 - 5/25/19) for an upper GI bleed. She had actually come to the ER earlier on the morning of 5/21/19 for chest pain/muscle spasm and discharged, but her , Gwendolyn Childress, visited her later that day and told her to come back to the ER because she was vomiting blood. She had been having intractable nausea/vomiting with coffee-ground emesis. My colleague, Dr. Denver Dupree, performed her EGD on admission and found small esophageal varices and hematin. Repeat EGD found gastritis - she had been taking large amount of naproxen for chronic pain. Her liver function (based on labs and clinical exam) appears to be stable. No further bleeding since stopping all NSAIDs and taking Protonix. She maintains that she is still abstinent from alcohol. She has had some discomfort around her umbilicus with an obvious umbilical hernia.  11/8/19: Patient presents for reevaluation of her alcoholic cirrhosis. Doing well. On furosemide 40/spironolactone 100 daily. Had umbilical hernia repaired. No signs of bleeding. Minimal abdominal distension. Avoiding alcohol.  5/4/20: Patient presents for reevaluation of her alcoholic cirrhosis. Still on same dose of diuretics. Abstinent from alcohol. Due for HCC screening in July 2020. Last EGD was in May 2019 (see above). Had to have umbilical hernia repair revised - did well from a surgical standpoint but had a reaction to anesthesia. Protonix working well for heartburn.  7/30/21:  Patient returns for reevaluation of her alcoholic cirrhosis - she has maintained sobriety.  However, she is not always compliant with low sodium diet and diuretics, and she noticed recently that she was leaking at her umbilicus where she had prior surgery and revision.  She saw Dr. Worrell who told her that she needed to go back on her diuretics.  She has done well since then.  She is due for HCC surveillance and variceal surveillance.  Of note, her last RUQ ultrasound in June 2020 showed a right renal mass - she has since gone to a urologist who did further testing.  She was told that it was benign.  3/16/22 (raissa Henao, JON): Ms. Lisa is a 67-year-old female who presents today for ascites.  [facility] nausea x1 month with vomiting.  Labs- WBC 6.41, Hgb 13.3, HCT 39.1, MCV 92.4, lipase 60, bilirubin 2.6, alk phos 122, , ALT 28, sodium 136.  CT ab/pelvis with IV contrast-suspected abscess in the periumbilical hernia.  Stigmata of cirrhosis with portal hypertension.  There are innumerable nodules noted throughout the liver which are not well assessed by CT.  Further assessment with nonemergent MRI of abdomen with and without contrast is suggested.  Colonic and duodenal diverticulosis.  Left renal stone.  General surgery was consulted for periumbilical abscess.  Patient left AMA.  Today, she reports her psychiatric medications were being adjusted.  At that time, she was having nausea and vomiting.  She reports that she was unable to take her diuretics.  Nausea and vomiting resolved a couple of weeks ago now, but she is requesting zofran. Patient is taking furosemide 40 mg daily and spironolactone 100 mg daily now.  She reports her ascites began last week.  She reports she is occasionally drinking alcohol now.  She reports she went a whole year without drinking alcohol.  She reports she is not following a low-sodium diet.  She denies jaundice, confusion, and lower extremity edema.  She denies cardiac/kidney disease, diabetes, blood thinners, and home O2.  Patient does have COPD, but does not see a pulmonologist.  Patient reports she did not do RUQ ultrasound, EGD, or labs Dr. Singleton ordered because she was doing well.  12/2/22: Patient presents for urgent consultation. She had a CT abdomen/pelvis with and without contrast to evaluate her urologic system, and she was told that she needed to follow up with me "urgently". The CT scan was done on 11/23/22 - there were no significant changes from prior scans. She still has cirrhosis with splenomegaly (due to portal hypertension). No liver masses noted. Labs from 3/16/22 showed bilirubin 2.3, AST 76, , and no other significant abnormalities. (She had normal creatinine, sodium, INR, platelets, and AFP).  However, she complains today that her abdomen is more distended, and her umbilical hernia has returned. She denies any alcohol use. She admits to non-compliance with low-sodium diet. She likes olives. Interestingly enough, her CT scan report made no mention of ascites or hernia.  3/3/23: Patient returns for reevaluation of her alcoholic cirrhosis and oropharyngeal dysphagia. She had a paracentesis since her last visit and has managed her ascites with diuretics/low-sodium diet. Despite having a less distended stomach, she still has a reducible umbilical hernia. She said that she saw a surgeon, but I have no records of that encounter - will try to obtain. I hesitate to refer patient back for surgical repair of the umbilical hernia - this apparently had been repaired in the past but "burst" when she developed ascites. She states that she had significant respiratory issues during the last repair as well. Of note, labs drawn on 12/2/22 showed an elevated AFP (7.8), but the rest of her labs looked better than they ever have with the only abnormalities being an ALT 49/bili 1.4. (Last labs from 3/17/22 showed a bilirubin of 2.5). Normal CMP/CBC otherwise. A follow-up MRI was done on 12/15/22 due to the elevated AFP - it showed no masses, but it did show cirrhotic changes with portal hypertension. Labs from 1/6/23 showed a hemoglobin of 10.7 g/dL, which is only slightly lower than the hemoglobin of 11.3 on 12/2/22.  Interestingly enough, the main reason the patient returned to my clinic is because of swallowing difficulties. On 7/8/22, I performed her last EGD and only found grade 1 esophageal varices. She states that water and pills tend to "stick" in her neck intermittently. I had ordered a modified barium swallow at her last visit that apparently was not scheduled. She would like to pursue that now.

## 2023-03-04 ENCOUNTER — TELEPHONE ENCOUNTER (OUTPATIENT)
Dept: URBAN - METROPOLITAN AREA CLINIC 19 | Facility: CLINIC | Age: 68
End: 2023-03-04

## 2023-03-17 ENCOUNTER — TELEPHONE ENCOUNTER (OUTPATIENT)
Dept: URBAN - METROPOLITAN AREA CLINIC 19 | Facility: CLINIC | Age: 68
End: 2023-03-17

## 2023-03-17 RX ORDER — ONDANSETRON 4 MG/1
1 TABLET ON THE TONGUE AND ALLOW TO DISSOLVE ORALLY TABLET, ORALLY DISINTEGRATING ORAL
Qty: 40 TABLET | Refills: 3

## 2023-03-21 ENCOUNTER — TELEPHONE ENCOUNTER (OUTPATIENT)
Dept: URBAN - METROPOLITAN AREA CLINIC 19 | Facility: CLINIC | Age: 68
End: 2023-03-21

## 2023-03-22 ENCOUNTER — TELEPHONE ENCOUNTER (OUTPATIENT)
Dept: URBAN - METROPOLITAN AREA CLINIC 23 | Facility: CLINIC | Age: 68
End: 2023-03-22

## 2023-03-22 ENCOUNTER — LAB OUTSIDE AN ENCOUNTER (OUTPATIENT)
Dept: URBAN - METROPOLITAN AREA CLINIC 19 | Facility: CLINIC | Age: 68
End: 2023-03-22

## 2023-03-24 ENCOUNTER — LAB OUTSIDE AN ENCOUNTER (OUTPATIENT)
Dept: URBAN - METROPOLITAN AREA CLINIC 19 | Facility: CLINIC | Age: 68
End: 2023-03-24

## 2023-03-24 LAB
ABSOLUTE BASOPHIL COUNT: 0.03
ABSOLUTE EOSINOPHIL COUNT: 0
ABSOLUTE IMMATURE GRANULOCYTE  COUNT: 0.03
ABSOLUTE LYMPHOCYTE COUNT: 0.56
ABSOLUTE MONOCYTE COUNT: 0.34
ABSOLUTE NEUTROPHIL COUNT (ANC): 3.92
ABSOLUTE NRBC  COUNT: 0
BASOPHIL AUTO: 1
EOS AUTO: 0
HCT: 33.8
HGB: 11
IMMATURE GRANULOCYTES AUTO: 0.6
INR: 1.5
LYMPH AUTO: 12
MCH: 26.9
MCHC: 32.5
MCV: 82.6
MONO AUTO: 7
MPV: 9.2
NEUTRO AUTO: 80
NRBC AUTO: 0
PARTIAL THROMBOPLASTIN TIME: 37.5
PERFORMING LAB: (no result)
PLATELETS: 131
PT: 17.8
RBC: 4.09
RDW: 18.8
WBC: 4.9

## 2023-03-28 ENCOUNTER — TELEPHONE ENCOUNTER (OUTPATIENT)
Dept: URBAN - METROPOLITAN AREA CLINIC 19 | Facility: CLINIC | Age: 68
End: 2023-03-28

## 2023-04-20 ENCOUNTER — OFFICE VISIT (OUTPATIENT)
Dept: URBAN - METROPOLITAN AREA CLINIC 19 | Facility: CLINIC | Age: 68
End: 2023-04-20

## 2023-04-28 ENCOUNTER — LAB OUTSIDE AN ENCOUNTER (OUTPATIENT)
Dept: URBAN - METROPOLITAN AREA CLINIC 19 | Facility: CLINIC | Age: 68
End: 2023-04-28

## 2023-06-09 ENCOUNTER — LAB OUTSIDE AN ENCOUNTER (OUTPATIENT)
Dept: URBAN - METROPOLITAN AREA CLINIC 19 | Facility: CLINIC | Age: 68
End: 2023-06-09

## 2023-06-09 ENCOUNTER — OFFICE VISIT (OUTPATIENT)
Dept: URBAN - METROPOLITAN AREA CLINIC 19 | Facility: CLINIC | Age: 68
End: 2023-06-09
Payer: MEDICARE

## 2023-06-09 ENCOUNTER — WEB ENCOUNTER (OUTPATIENT)
Dept: URBAN - METROPOLITAN AREA CLINIC 19 | Facility: CLINIC | Age: 68
End: 2023-06-09

## 2023-06-09 ENCOUNTER — OFFICE VISIT (OUTPATIENT)
Dept: URBAN - METROPOLITAN AREA CLINIC 19 | Facility: CLINIC | Age: 68
End: 2023-06-09

## 2023-06-09 VITALS
DIASTOLIC BLOOD PRESSURE: 68 MMHG | TEMPERATURE: 99.1 F | SYSTOLIC BLOOD PRESSURE: 128 MMHG | HEART RATE: 76 BPM | WEIGHT: 125 LBS | HEIGHT: 59 IN | BODY MASS INDEX: 25.2 KG/M2

## 2023-06-09 DIAGNOSIS — R18.8 ASCITES: ICD-10-CM

## 2023-06-09 DIAGNOSIS — I85.00 ESOPHAGEAL VARICES: ICD-10-CM

## 2023-06-09 DIAGNOSIS — K70.30 ALCOHOLIC CIRRHOSIS: ICD-10-CM

## 2023-06-09 DIAGNOSIS — K21.9 GERD WITHOUT ESOPHAGITIS: ICD-10-CM

## 2023-06-09 PROBLEM — 266435005: Status: ACTIVE | Noted: 2023-06-09

## 2023-06-09 PROCEDURE — 99215 OFFICE O/P EST HI 40 MIN: CPT | Performed by: NURSE PRACTITIONER

## 2023-06-09 RX ORDER — ACETAMINOPHEN 500 MG/1
1 TABLET AS NEEDED TABLET ORAL
Status: ACTIVE | COMMUNITY

## 2023-06-09 RX ORDER — HYDROXYZINE HYDROCHLORIDE 25 MG/1
1 TABLET AT BEDTIME AS NEEDED TABLET, FILM COATED ORAL ONCE A DAY
Status: ACTIVE | COMMUNITY

## 2023-06-09 RX ORDER — CETIRIZINE HYDROCHLORIDE 10 MG/1
1 TABLET TABLET, FILM COATED ORAL ONCE A DAY
Status: ACTIVE | COMMUNITY

## 2023-06-09 RX ORDER — AMLODIPINE BESYLATE 5 MG/1
1 TABLET TABLET ORAL ONCE A DAY
Status: ACTIVE | COMMUNITY

## 2023-06-09 RX ORDER — FUROSEMIDE 40 MG/1
1 TABLET TABLET ORAL ONCE A DAY
Status: ACTIVE | COMMUNITY

## 2023-06-09 RX ORDER — MONTELUKAST 10 MG/1
1 TABLET TABLET, FILM COATED ORAL ONCE A DAY
Status: ACTIVE | COMMUNITY

## 2023-06-09 RX ORDER — ESCITALOPRAM OXALATE 5 MG/1
1 TABLET TABLET, FILM COATED ORAL ONCE A DAY
Status: ACTIVE | COMMUNITY

## 2023-06-09 RX ORDER — GABAPENTIN 600 MG/1
1 TABLET TABLET, FILM COATED ORAL ONCE A DAY
Status: ACTIVE | COMMUNITY

## 2023-06-09 RX ORDER — ONDANSETRON 4 MG/1
1 TABLET ON THE TONGUE AND ALLOW TO DISSOLVE ORALLY TABLET, ORALLY DISINTEGRATING ORAL
Qty: 40 TABLET | Refills: 3 | Status: ACTIVE | COMMUNITY

## 2023-06-09 RX ORDER — BENZTROPINE MESYLATE 1 MG/1
1 TABLET TABLET ORAL ONCE A DAY
Status: ACTIVE | COMMUNITY

## 2023-06-09 NOTE — HPI-TODAY'S VISIT:
10/13/16: Maddie Lisa returns to my clinic for reevalation of her liver issues. Due to concerns about liver cancer, she had a CT-guided liver biopsy performed on 8/29/16 that revealed fragments of benign liver with cirrhosis. I discussed with her the results of her biopsy and that the "masses" seen on imaging could be regenerative nodules, which are benign. She has had multiple paracenteses - she reports that she has had 3 in total. She complains of abdominal cramping. I discussed the results with her and the need to stop drinking alcohol. She states that she uses alcohol to relieve her abdominal pain; she has no interest in stopping at this time.  8/31/17: Patient returns after a long hiatus. She appears to have got her life together - she has been sober now for 6 months. 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 She has not given up smoking yet. Her only problem today is mild abdominal swelling. Her PCP agreed to give her a rx for spironolactone 50/furosemide 20. This is not a therapeutic dose. She needs HCC and variceal screening.  10/1/18: Patient returns for reevaluation - another year has gone by. Labs were unremarkable when they were drawn at last visit. RUQ ultrasound showed marked hepatomegaly but no cirrhosis. However, she had a prior liver biopsy that did show evidence of cirrhosis. She states that she takes her medications, avoids salt, and has not had any alcohol in a year. Complains of constipation followed by loose stools.  1/25/19: Patient returns for reevaluation of her biopsy-proven alcoholic cirrhosis. Patient has been sober for a year now, and she is doing very well. No complaints except sciatica. Spent a long time talking about her eye drops (for glaucoma) - sometimes they make her nauseated.  7/9/19: Patient presents for follow-up after recent [facility] hospitalization (5/21/19 - 5/25/19) for an upper GI bleed. She had actually come to the ER earlier on the morning of 5/21/19 for chest pain/muscle spasm and discharged, but her , Gwendolyn Childress, visited her later that day and told her to come back to the ER because she was vomiting blood. She had been having intractable nausea/vomiting with coffee-ground emesis. My colleague, Dr. Denver Dupree, performed her EGD on admission and found small esophageal varices and hematin. Repeat EGD found gastritis - she had been taking large amount of naproxen for chronic pain. Her liver function (based on labs and clinical exam) appears to be stable. No further bleeding since stopping all NSAIDs and taking Protonix. She maintains that she is still abstinent from alcohol. She has had some discomfort around her umbilicus with an obvious umbilical hernia.  11/8/19: Patient presents for reevaluation of her alcoholic cirrhosis. Doing well. On furosemide 40/spironolactone 100 daily. Had umbilical hernia repaired. No signs of bleeding. Minimal abdominal distension. Avoiding alcohol.  5/4/20: Patient presents for reevaluation of her alcoholic cirrhosis. Still on same dose of diuretics. Abstinent from alcohol. Due for HCC screening in July 2020. Last EGD was in May 2019 (see above). Had to have umbilical hernia repair revised - did well from a surgical standpoint but had a reaction to anesthesia. Protonix working well for heartburn.  7/30/21:  Patient returns for reevaluation of her alcoholic cirrhosis - she has maintained sobriety.  However, she is not always compliant with low sodium diet and diuretics, and she noticed recently that she was leaking at her umbilicus where she had prior surgery and revision.  She saw Dr. Worrell who told her that she needed to go back on her diuretics.  She has done well since then.  She is due for HCC surveillance and variceal surveillance.  Of note, her last RUQ ultrasound in June 2020 showed a right renal mass - she has since gone to a urologist who did further testing.  She was told that it was benign.  3/16/22 (raissa Henao, JON): Ms. Lisa is a 67-year-old female who presents today for ascites.  [facility] nausea x1 month with vomiting.  Labs- WBC 6.41, Hgb 13.3, HCT 39.1, MCV 92.4, lipase 60, bilirubin 2.6, alk phos 122, , ALT 28, sodium 136.  CT ab/pelvis with IV contrast-suspected abscess in the periumbilical hernia.  Stigmata of cirrhosis with portal hypertension.  There are innumerable nodules noted throughout the liver which are not well assessed by CT.  Further assessment with nonemergent MRI of abdomen with and without contrast is suggested.  Colonic and duodenal diverticulosis.  Left renal stone.  General surgery was consulted for periumbilical abscess.  Patient left AMA.  Today, she reports her psychiatric medications were being adjusted.  At that time, she was having nausea and vomiting.  She reports that she was unable to take her diuretics.  Nausea and vomiting resolved a couple of weeks ago now, but she is requesting zofran. Patient is taking furosemide 40 mg daily and spironolactone 100 mg daily now.  She reports her ascites began last week.  She reports she is occasionally drinking alcohol now.  She reports she went a whole year without drinking alcohol.  She reports she is not following a low-sodium diet.  She denies jaundice, confusion, and lower extremity edema.  She denies cardiac/kidney disease, diabetes, blood thinners, and home O2.  Patient does have COPD, but does not see a pulmonologist.  Patient reports she did not do RUQ ultrasound, EGD, or labs Dr. Singleton ordered because she was doing well.  12/2/22: Patient presents for urgent consultation. She had a CT abdomen/pelvis with and without contrast to evaluate her urologic system, and she was told that she needed to follow up with me "urgently". The CT scan was done on 11/23/22 - there were no significant changes from prior scans. She still has cirrhosis with splenomegaly (due to portal hypertension). No liver masses noted. Labs from 3/16/22 showed bilirubin 2.3, AST 76, , and no other significant abnormalities. (She had normal creatinine, sodium, INR, platelets, and AFP).  However, she complains today that her abdomen is more distended, and her umbilical hernia has returned. She denies any alcohol use. She admits to non-compliance with low-sodium diet. She likes olives. Interestingly enough, her CT scan report made no mention of ascites or hernia.  3/3/23: DR Singleton   Patient returns for reevaluation of her alcoholic cirrhosis and oropharyngeal dysphagia. She had a paracentesis since her last visit and has managed her ascites with diuretics/low-sodium diet. Despite having a less distended stomach, she still has a reducible umbilical hernia. She said that she saw a surgeon, but I have no records of that encounter - will try to obtain. I hesitate to refer patient back for surgical repair of the umbilical hernia - this apparently had been repaired in the past but "burst" when she developed ascites. She states that she had significant respiratory issues during the last repair as well. Of note, labs drawn on 12/2/22 showed an elevated AFP (7.8), but the rest of her labs looked better than they ever have with the only abnormalities being an ALT 49/bili 1.4. (Last labs from 3/17/22 showed a bilirubin of 2.5). Normal CMP/CBC otherwise. A follow-up MRI was done on 12/15/22 due to the elevated AFP - it showed no masses, but it did show cirrhotic changes with portal hypertension. Labs from 1/6/23 showed a hemoglobin of 10.7 g/dL, which is only slightly lower than the hemoglobin of 11.3 on 12/2/22.  Interestingly enough, the main reason the patient returned to my clinic is because of swallowing difficulties. On 7/8/22, I performed her last EGD and only found grade 1 esophageal varices. She states that water and pills tend to "stick" in her neck intermittently. I had ordered a modified barium swallow at her last visit that apparently was not scheduled. She would like to pursue that now. PLAN barium swallow  Paracentesis with 3.9 L removed in March.  TODAY 6/9/23 Jill CAMPO NP Modified barium swallow 4/28/2023 unremarkable video esophagram.  Speech pathology portion recommended regular diet, medications mixing with applesauce and alternating bites and sips, needs partial/denture.  No need for speech swallow therapy  She is here to discuss her MBS results. She is taking her medications with ice cream/slushy and it seems to help. She has poor dentition and needs to have eval by dentist. Her GERD and nausea is doing well on current regimen.   Has minimal abdominal distention, no leg swelling. She does have umbilical protrusion. She is taking Lasix 40mg daily and Spironolactone 100mg daily- RX by her PCP. She does drink alcohol about twice a month.   PLAN Liver labs, cont current cirrhosis meds

## 2023-06-10 LAB
A/G RATIO: 0.9
ABSOLUTE BASOPHILS: 72
ABSOLUTE EOSINOPHILS: 41
ABSOLUTE LYMPHOCYTES: 1103
ABSOLUTE MONOCYTES: 482
ABSOLUTE NEUTROPHILS: 2804
ALBUMIN: 3.9
ALKALINE PHOSPHATASE: 214
ALT (SGPT): 22
AST (SGOT): 43
BASOPHILS: 1.6
BILIRUBIN, TOTAL: 0.8
BUN/CREATININE RATIO: (no result)
BUN: 12
CALCIUM: 10
CARBON DIOXIDE, TOTAL: 27
CHLORIDE: 102
CREATININE: 0.76
EGFR: 86
EOSINOPHILS: 0.9
GLOBULIN, TOTAL: 4.2
GLUCOSE: 106
HEMATOCRIT: 35.8
HEMOGLOBIN: 11.4
INR: 1.2
LYMPHOCYTES: 24.5
MCH: 27.2
MCHC: 31.8
MCV: 85.4
MONOCYTES: 10.7
MPV: 9.3
NEUTROPHILS: 62.3
PLATELET COUNT: 186
POTASSIUM: 3.8
PROTEIN, TOTAL: 8.1
PT: 12.2
RDW: 15.7
RED BLOOD CELL COUNT: 4.19
SODIUM: 136
WHITE BLOOD CELL COUNT: 4.5

## 2023-08-11 ENCOUNTER — OFFICE VISIT (OUTPATIENT)
Dept: URBAN - METROPOLITAN AREA MEDICAL CENTER 25 | Facility: MEDICAL CENTER | Age: 68
End: 2023-08-11

## 2023-08-23 ENCOUNTER — LAB OUTSIDE AN ENCOUNTER (OUTPATIENT)
Dept: URBAN - METROPOLITAN AREA CLINIC 19 | Facility: CLINIC | Age: 68
End: 2023-08-23

## 2023-08-23 LAB
ALB BF TYPE: (no result)
ALBUMIN BF: 0.4
BODY FLUID APPEARANCE: CLEAR
BODY FLUID COLOR: (no result)
BODY FLUID DIFF:: (no result)
BODY FLUID LYMPH: 56
BODY FLUID MESO: 9
BODY FLUID MONO/MACRO: 17
BODY FLUID NEUT: 18
BODY FLUID NUCLEATED CELLS: 33
BODY FLUID RBC: <2000
BODY FLUID TOTAL CELLS COUNTED: 100
BODY FLUID TYPE: (no result)
PERFORMING LAB: (no result)
PROT BF TYPE: (no result)
PROTEIN BF: 0.8

## 2023-09-15 ENCOUNTER — OFFICE VISIT (OUTPATIENT)
Dept: URBAN - METROPOLITAN AREA CLINIC 19 | Facility: CLINIC | Age: 68
End: 2023-09-15
Payer: MEDICARE

## 2023-09-15 ENCOUNTER — LAB OUTSIDE AN ENCOUNTER (OUTPATIENT)
Dept: URBAN - METROPOLITAN AREA CLINIC 19 | Facility: CLINIC | Age: 68
End: 2023-09-15

## 2023-09-15 ENCOUNTER — WEB ENCOUNTER (OUTPATIENT)
Dept: URBAN - METROPOLITAN AREA CLINIC 19 | Facility: CLINIC | Age: 68
End: 2023-09-15

## 2023-09-15 VITALS
TEMPERATURE: 98.3 F | HEART RATE: 64 BPM | WEIGHT: 135 LBS | HEIGHT: 59 IN | SYSTOLIC BLOOD PRESSURE: 132 MMHG | BODY MASS INDEX: 27.21 KG/M2 | DIASTOLIC BLOOD PRESSURE: 71 MMHG

## 2023-09-15 DIAGNOSIS — I85.00 ESOPHAGEAL VARICES: ICD-10-CM

## 2023-09-15 DIAGNOSIS — R13.12 OROPHARYNGEAL DYSPHAGIA: ICD-10-CM

## 2023-09-15 DIAGNOSIS — R18.8 ASCITES: ICD-10-CM

## 2023-09-15 DIAGNOSIS — Z86.010 HISTORY OF COLON POLYPS: ICD-10-CM

## 2023-09-15 DIAGNOSIS — K42.9 UMBILICAL HERNIA: ICD-10-CM

## 2023-09-15 DIAGNOSIS — K70.30 ALCOHOLIC CIRRHOSIS: ICD-10-CM

## 2023-09-15 DIAGNOSIS — K21.9 GERD WITHOUT ESOPHAGITIS: ICD-10-CM

## 2023-09-15 PROCEDURE — 99215 OFFICE O/P EST HI 40 MIN: CPT | Performed by: NURSE PRACTITIONER

## 2023-09-15 RX ORDER — ACETAMINOPHEN 500 MG/1
1 TABLET AS NEEDED TABLET ORAL
COMMUNITY

## 2023-09-15 RX ORDER — BENZTROPINE MESYLATE 1 MG/1
1 TABLET TABLET ORAL ONCE A DAY
COMMUNITY

## 2023-09-15 RX ORDER — ONDANSETRON 4 MG/1
1 TABLET ON THE TONGUE AND ALLOW TO DISSOLVE ORALLY TABLET, ORALLY DISINTEGRATING ORAL
Qty: 40 TABLET | Refills: 3 | COMMUNITY

## 2023-09-15 RX ORDER — FUROSEMIDE 40 MG/1
1 TABLET TABLET ORAL ONCE A DAY
COMMUNITY

## 2023-09-15 RX ORDER — CETIRIZINE HYDROCHLORIDE 10 MG/1
1 TABLET TABLET, FILM COATED ORAL ONCE A DAY
COMMUNITY

## 2023-09-15 RX ORDER — HYDROXYZINE HYDROCHLORIDE 25 MG/1
1 TABLET AT BEDTIME AS NEEDED TABLET, FILM COATED ORAL ONCE A DAY
COMMUNITY

## 2023-09-15 RX ORDER — MONTELUKAST 10 MG/1
1 TABLET TABLET, FILM COATED ORAL ONCE A DAY
COMMUNITY

## 2023-09-15 RX ORDER — AMLODIPINE BESYLATE 5 MG/1
1 TABLET TABLET ORAL ONCE A DAY
COMMUNITY

## 2023-09-15 RX ORDER — GABAPENTIN 600 MG/1
1 TABLET TABLET, FILM COATED ORAL ONCE A DAY
COMMUNITY

## 2023-09-15 RX ORDER — ESCITALOPRAM OXALATE 5 MG/1
1 TABLET TABLET, FILM COATED ORAL ONCE A DAY
COMMUNITY

## 2023-09-15 NOTE — HPI-TODAY'S VISIT:
Summary of previous visits: Dx with alcoholic cirrhosis of liver with ascites, in 2016 via liver BX. has had frequent paracentesis, non compliant at times with diuretics, low NA diet and abstaining from alcohol.  CT-guided liver biopsy performed on 8/29/16 that revealed fragments of benign liver with cirrhosis RUQ ultrasound 2018 showed marked hepatomegaly but no cirrhosis.  HX of GIB, esophageal varices and admission 2019: EGD on admission and found small esophageal varices and hematin. Repeat EGD found gastritis - she had been taking large amount of naproxen for chronic pain.  Umbilical hernia repair with infection and reoccurence, not surgical candidate Taking  furosemide 40/spironolactone 100 daily.   RUQ ultrasound in June 2020 showed a right renal mass - she has since gone to a urologist who did further testing.  She was told that it was benign. CT ab/pelvis with IV contrast 2022 suspected abscess in the periumbilical hernia.  Stigmata of cirrhosis with portal hypertension.  There are innumerable nodules noted throughout the liver,  Colonic and duodenal diverticulosis.  Left renal stone.   CT scan was done on 11/23/22 - there were no significant changes from prior scans. She still has cirrhosis with splenomegaly (due to portal hypertension). No liver masses noted.  3/3/23: DR Singleton alcoholic cirrhosis and oropharyngeal dysphagia. She had a paracentesis since her last visit and has managed her ascites with diuretics/low-sodium diet. Despite having a less distended stomach, she still has a reducible umbilical hernia.  MRI was done on 12/15/22 due to the elevated AFP - it showed no masses, but it did show cirrhotic changes with portal hypertension.  Interestingly enough, the main reason the patient returned to my clinic is because of swallowing difficulties. On EGD 7/8/22  grade 1 esophageal varices. She states that water and pills tend to "stick" in her neck intermittently.  Paracentesis with 3.9 L removed in March.  6/9/23 Jill CAMPO NP Modified barium swallow 4/28/2023 unremarkable video esophagram.  Speech pathology portion recommended regular diet, medications mixing with applesauce and alternating bites and sips, needs partial/denture.  No need for speech swallow therapy She is here to discuss her MBS results. She is taking her medications with ice cream/slushy and it seems to help. She has poor dentition and needs to have eval by dentist. Her GERD and nausea is doing well on current regimen.  Has minimal abdominal distention, no leg swelling. She does have umbilical protrusion. She is taking Lasix 40mg daily and Spironolactone 100mg daily- RX by her PCP. She does drink alcohol about twice a month.  PLAN Liver labs, cont current cirrhosis meds, Low-sodium diet, monitor weight, labs/ultrasound every 6 months, EGD with variceal banding  TODAY 9/15/23 Jill S NP Labs 6/10/2023 creatinine 0.76, sodium 136, total bili 0.8, alk phos 214, AST 43, ALT normal, hemoglobin 11.4 platelets normal, INR 1.2 MELD NA 12 EGD not done  She was seen in the emergency room 8/23/2023 for abdominal swelling, weakness and fatigue.  Labs hemoglobin 11.2, platelets normal, INR 1.2, creatinine 0.8, sodium 133, total bili 5.7, alk phos 179, AST 86, ALT 35, lipase normal MELD NA 18 She had a paracentesis with a total of 11.2 L drained. Chest x-ray showed bibasilar infiltrates and declined admission and left AGAINST MEDICAL ADVICE.  Was placed on doxycycline.  She was seen again in the emergency room 9/8/2023 for abdominal distention and cough.  She had intermittent fevers, nausea vomiting and diarrhea, with confusion over the last week.  Did not complete her doxycycline. Labs WBC normal, hemoglobin 10.9, platelets normal, creatinine 0.7, sodium 132, total bili 1.4, alk phos 175, AST 53, ALT normal, lipase normal, Ammonia normal They were unable to do paracentesis as it was a Friday night.  She declined hospital admission.  She was to call the office on Monday to schedule outpatient paracentesis.  She did miss some doses of her diuretics due to nausea/vomiting in August and then began having abdominal swelling. Has been taking it daily since then. She has not been following low NA diet, and is drinking a lot of water. Is not drinking alcohol.  She did complete her abx and her cough has improved. Does have some ear fullness, sinus drainage.  Abdomen is tight/distended, no leg swelling, is up 10lbs since last visit. Confusion has resolved.    PLAN Needs paracentesis today, labs

## 2023-09-16 LAB
A/G RATIO: 0.8
ABSOLUTE BASOPHILS: 81
ABSOLUTE EOSINOPHILS: 32
ABSOLUTE LYMPHOCYTES: 729
ABSOLUTE MONOCYTES: 659
ABSOLUTE NEUTROPHILS: 3899
ALBUMIN: 3.1
ALKALINE PHOSPHATASE: 155
ALT (SGPT): 17
AST (SGOT): 34
BASOPHILS: 1.5
BILIRUBIN, TOTAL: 1.5
BUN/CREATININE RATIO: (no result)
BUN: 8
CALCIUM: 8.8
CARBON DIOXIDE, TOTAL: 28
CHLORIDE: 98
CREATININE: 0.59
EGFR: 99
EOSINOPHILS: 0.6
GLOBULIN, TOTAL: 3.9
GLUCOSE: 95
HEMATOCRIT: 33.3
HEMOGLOBIN: 11.1
INR: 1.2
LYMPHOCYTES: 13.5
MCH: 29.4
MCHC: 33.3
MCV: 88.1
MONOCYTES: 12.2
MPV: 9.6
NEUTROPHILS: 72.2
PLATELET COUNT: 328
POTASSIUM: 3.5
PROTEIN, TOTAL: 7
PT: 12.3
RDW: 15.1
RED BLOOD CELL COUNT: 3.78
SODIUM: 136
WHITE BLOOD CELL COUNT: 5.4

## 2023-09-19 ENCOUNTER — TELEPHONE ENCOUNTER (OUTPATIENT)
Dept: URBAN - METROPOLITAN AREA CLINIC 19 | Facility: CLINIC | Age: 68
End: 2023-09-19

## 2023-09-19 RX ORDER — HYOSCYAMINE SULFATE 0.12 MG/1
1 TABLET UNDER THE TONGUE AND ALLOW TO DISSOLVE AS NEEDED TABLET SUBLINGUAL THREE TIMES A DAY
Qty: 60 | Refills: 1 | OUTPATIENT
Start: 2023-09-19 | End: 2023-11-17

## 2023-10-02 ENCOUNTER — OFFICE VISIT (OUTPATIENT)
Dept: URBAN - METROPOLITAN AREA CLINIC 19 | Facility: CLINIC | Age: 68
End: 2023-10-02

## 2023-10-02 NOTE — HPI-TODAY'S VISIT:
Summary of previous visits: Dx with alcoholic cirrhosis of liver with ascites, in 2016 via liver BX. has had frequent paracentesis, non compliant at times with diuretics, low NA diet and abstaining from alcohol.  CT-guided liver biopsy performed on 8/29/16 that revealed fragments of benign liver with cirrhosis RUQ ultrasound 2018 showed marked hepatomegaly but no cirrhosis.  HX of GIB, esophageal varices and admission 2019: EGD on admission and found small esophageal varices and hematin. Repeat EGD found gastritis - she had been taking large amount of naproxen for chronic pain.  Umbilical hernia repair with infection and reoccurence, not surgical candidate Taking  furosemide 40/spironolactone 100 daily.   RUQ ultrasound in June 2020 showed a right renal mass - she has since gone to a urologist who did further testing.  She was told that it was benign. CT ab/pelvis with IV contrast 2022 suspected abscess in the periumbilical hernia.  Stigmata of cirrhosis with portal hypertension.  There are innumerable nodules noted throughout the liver,  Colonic and duodenal diverticulosis.  Left renal stone.   CT scan was done on 11/23/22 - there were no significant changes from prior scans. She still has cirrhosis with splenomegaly (due to portal hypertension). No liver masses noted.  3/3/23: DR Singleton alcoholic cirrhosis and oropharyngeal dysphagia. She had a paracentesis since her last visit and has managed her ascites with diuretics/low-sodium diet. Despite having a less distended stomach, she still has a reducible umbilical hernia.  MRI was done on 12/15/22 due to the elevated AFP - it showed no masses, but it did show cirrhotic changes with portal hypertension.  Interestingly enough, the main reason the patient returned to my clinic is because of swallowing difficulties. On EGD 7/8/22  grade 1 esophageal varices. She states that water and pills tend to "stick" in her neck intermittently.  Paracentesis with 3.9 L removed in March.  6/9/23 Jill CAMPO NP Modified barium swallow 4/28/2023 unremarkable video esophagram.  Speech pathology portion recommended regular diet, medications mixing with applesauce and alternating bites and sips, needs partial/denture.  No need for speech swallow therapy She is here to discuss her MBS results. She is taking her medications with ice cream/slushy and it seems to help. She has poor dentition and needs to have eval by dentist. Her GERD and nausea is doing well on current regimen.  Has minimal abdominal distention, no leg swelling. She does have umbilical protrusion. She is taking Lasix 40mg daily and Spironolactone 100mg daily- RX by her PCP. She does drink alcohol about twice a month.  PLAN Liver labs, cont current cirrhosis meds, Low-sodium diet, monitor weight, labs/ultrasound every 6 months, EGD with variceal banding  9/15/23 Jill CAMPO NP Labs 6/10/2023 creatinine 0.76, sodium 136, total bili 0.8, alk phos 214, AST 43, ALT normal, hemoglobin 11.4 platelets normal, INR 1.2 MELD NA 12 EGD not done She was seen in the emergency room 8/23/2023 for abdominal swelling, weakness and fatigue.  Labs hemoglobin 11.2, platelets normal, INR 1.2, creatinine 0.8, sodium 133, total bili 5.7, alk phos 179, AST 86, ALT 35, lipase normal MELD NA 18 She had a paracentesis with a total of 11.2 L drained. Chest x-ray showed bibasilar infiltrates and declined admission and left AGAINST MEDICAL ADVICE.  Was placed on doxycycline.  She was seen again in the emergency room 9/8/2023 for abdominal distention and cough.  She had intermittent fevers, nausea vomiting and diarrhea, with confusion over the last week.  Did not complete her doxycycline. Labs WBC normal, hemoglobin 10.9, platelets normal, creatinine 0.7, sodium 132, total bili 1.4, alk phos 175, AST 53, ALT normal, lipase normal, Ammonia normal They were unable to do paracentesis as it was a Friday night.  She declined hospital admission.  She was to call the office on Monday to schedule outpatient paracentesis.  She did miss some doses of her diuretics due to nausea/vomiting in August and then began having abdominal swelling. Has been taking it daily since then. She has not been following low NA diet, and is drinking a lot of water. Is not drinking alcohol.  She did complete her abx and her cough has improved. Does have some ear fullness, sinus drainage.  Abdomen is tight/distended, no leg swelling, is up 10lbs since last visit. Confusion has resolved.  PLAN Needs paracentesis today, labs  TODAY Ultrasound paracentesis 9/15/2023 she had a total of 7.7 L removed. Labs completed MELD score 11   needs EGD

## 2023-10-03 ENCOUNTER — LAB OUTSIDE AN ENCOUNTER (OUTPATIENT)
Dept: URBAN - METROPOLITAN AREA CLINIC 19 | Facility: CLINIC | Age: 68
End: 2023-10-03

## 2023-10-03 ENCOUNTER — OFFICE VISIT (OUTPATIENT)
Dept: URBAN - METROPOLITAN AREA CLINIC 19 | Facility: CLINIC | Age: 68
End: 2023-10-03
Payer: MEDICARE

## 2023-10-03 VITALS
HEIGHT: 59 IN | DIASTOLIC BLOOD PRESSURE: 80 MMHG | BODY MASS INDEX: 25.44 KG/M2 | SYSTOLIC BLOOD PRESSURE: 160 MMHG | HEART RATE: 87 BPM | TEMPERATURE: 98.3 F | WEIGHT: 126.2 LBS

## 2023-10-03 DIAGNOSIS — K70.30 ALCOHOLIC CIRRHOSIS: ICD-10-CM

## 2023-10-03 DIAGNOSIS — R13.12 OROPHARYNGEAL DYSPHAGIA: ICD-10-CM

## 2023-10-03 DIAGNOSIS — I85.00 ESOPHAGEAL VARICES: ICD-10-CM

## 2023-10-03 DIAGNOSIS — R11.0 NAUSEA: ICD-10-CM

## 2023-10-03 DIAGNOSIS — R18.8 ASCITES: ICD-10-CM

## 2023-10-03 PROCEDURE — 99215 OFFICE O/P EST HI 40 MIN: CPT | Performed by: NURSE PRACTITIONER

## 2023-10-03 RX ORDER — ACETAMINOPHEN 500 MG/1
1 TABLET AS NEEDED TABLET ORAL
COMMUNITY

## 2023-10-03 RX ORDER — MONTELUKAST 10 MG/1
1 TABLET TABLET, FILM COATED ORAL ONCE A DAY
COMMUNITY

## 2023-10-03 RX ORDER — ESCITALOPRAM OXALATE 5 MG/1
1 TABLET TABLET, FILM COATED ORAL ONCE A DAY
COMMUNITY

## 2023-10-03 RX ORDER — AMLODIPINE BESYLATE 5 MG/1
1 TABLET TABLET ORAL ONCE A DAY
COMMUNITY

## 2023-10-03 RX ORDER — CETIRIZINE HYDROCHLORIDE 10 MG/1
1 TABLET TABLET, FILM COATED ORAL ONCE A DAY
COMMUNITY

## 2023-10-03 RX ORDER — FUROSEMIDE 40 MG/1
1 TABLET TABLET ORAL ONCE A DAY
COMMUNITY

## 2023-10-03 RX ORDER — GABAPENTIN 600 MG/1
1 TABLET TABLET, FILM COATED ORAL ONCE A DAY
COMMUNITY

## 2023-10-03 RX ORDER — HYOSCYAMINE SULFATE 0.12 MG/1
1 TABLET UNDER THE TONGUE AND ALLOW TO DISSOLVE AS NEEDED TABLET SUBLINGUAL THREE TIMES A DAY
Qty: 60 | Refills: 1 | Status: ACTIVE | COMMUNITY
Start: 2023-09-19 | End: 2023-11-17

## 2023-10-03 RX ORDER — BENZTROPINE MESYLATE 1 MG/1
1 TABLET TABLET ORAL ONCE A DAY
COMMUNITY

## 2023-10-03 RX ORDER — HYDROXYZINE HYDROCHLORIDE 25 MG/1
1 TABLET AT BEDTIME AS NEEDED TABLET, FILM COATED ORAL ONCE A DAY
COMMUNITY

## 2023-10-03 RX ORDER — ONDANSETRON 4 MG/1
1 TABLET ON THE TONGUE AND ALLOW TO DISSOLVE ORALLY TABLET, ORALLY DISINTEGRATING ORAL
Qty: 40 TABLET | Refills: 3 | COMMUNITY

## 2023-10-03 NOTE — HPI-TODAY'S VISIT:
68 year old female presents for follow up. She was last seen in office 9/15/23 for worsening abdominal swelling, wt gain of 10lbs. Her abdomen was tight/distended. She did miss some doses of her diuretics due to nausea/vomiting in August and then began having abdominal swelling. Has been taking it daily since then. She has not been following low NA diet, and is drinking a lot of water. Is not drinking alcohol. Taking furosemide 40mg/spironolactone 100 daily. Labs 8/23/23 hemoglobin 11.2, platelets normal, INR 1.2, creatinine 0.8, sodium 133, total bili 5.7, alk phos 179, AST 86, ALT 35, lipase normal MELD NA 18 She had a paracentesis in ER 8/23/23 with a total of 11.2 L drained. Chest x-ray showed bibasilar infiltrates and declined admission and left AGAINST MEDICAL ADVICE.   I ordered a Ultrasound paracentesis 9/15/2023 she had a total of 7.7 L removed. She is down 9lbs since last visit. Her abdomen is distended but better than before. No ankle swelling. She has been taking her diuretics as RX. She does cont to have nausea, taking Zofran prn. She does have cricopharyngeal dysphagia with solids/liquids, feels like a muscle spasm.  Denies cardiac HX, does have COPD-does not use O2.   PLAN She needs EGD to eval varices/dysphagia and nausea       Previous procedures/notes Dx with alcoholic cirrhosis of liver with ascites, in 2016 via liver BX. has had frequent paracentesis, non compliant at times with diuretics, low NA diet and abstaining from alcohol.  CT-guided liver biopsy performed on 8/29/16 that revealed fragments of benign liver with cirrhosis HX of GIB, esophageal varices and admission 2019: EGD on admission and found small esophageal varices and hematin. Taking furosemide 40/spironolactone 100 daily.    CT scan was done on 11/23/22 - there were no significant changes from prior scans. She still has cirrhosis with splenomegaly (due to portal hypertension). No liver masses noted. MRI was done on 12/15/22 due to the elevated AFP - it showed no masses, but it did show cirrhotic changes with portal hypertension.  EGD 7/8/22  grade 1 esophageal varices Paracentesis with 3.9 L removed in March. Modified barium swallow 4/28/2023 unremarkable video esophagram.  Speech pathology portion recommended regular diet, medications mixing with applesauce and alternating bites and sips, needs partial/denture.  No need for speech swallow therapy

## 2023-10-05 ENCOUNTER — TELEPHONE ENCOUNTER (OUTPATIENT)
Dept: URBAN - METROPOLITAN AREA CLINIC 19 | Facility: CLINIC | Age: 68
End: 2023-10-05

## 2023-10-24 ENCOUNTER — CLAIMS CREATED FROM THE CLAIM WINDOW (OUTPATIENT)
Dept: URBAN - METROPOLITAN AREA MEDICAL CENTER 25 | Facility: MEDICAL CENTER | Age: 68
End: 2023-10-24

## 2023-10-24 ENCOUNTER — CLAIMS CREATED FROM THE CLAIM WINDOW (OUTPATIENT)
Dept: URBAN - METROPOLITAN AREA MEDICAL CENTER 25 | Facility: MEDICAL CENTER | Age: 68
End: 2023-10-24
Payer: MEDICARE

## 2023-10-24 DIAGNOSIS — D64.89 ANEMIA DUE TO OTHER CAUSE: ICD-10-CM

## 2023-10-24 DIAGNOSIS — K70.31 ALCOHOLIC CIRRHOSIS OF LIVER WITH ASCITES: ICD-10-CM

## 2023-10-24 DIAGNOSIS — K76.82 ACUTE HEPATIC ENCEPHALOPATHY: ICD-10-CM

## 2023-10-24 DIAGNOSIS — A04.72 C. DIFFICILE: ICD-10-CM

## 2023-10-24 PROCEDURE — 99254 IP/OBS CNSLTJ NEW/EST MOD 60: CPT | Performed by: PHYSICIAN ASSISTANT

## 2023-10-24 PROCEDURE — 99222 1ST HOSP IP/OBS MODERATE 55: CPT | Performed by: STUDENT IN AN ORGANIZED HEALTH CARE EDUCATION/TRAINING PROGRAM

## 2023-10-24 PROCEDURE — G8427 DOCREV CUR MEDS BY ELIG CLIN: HCPCS | Performed by: STUDENT IN AN ORGANIZED HEALTH CARE EDUCATION/TRAINING PROGRAM

## 2023-10-24 PROCEDURE — 99254 IP/OBS CNSLTJ NEW/EST MOD 60: CPT | Performed by: STUDENT IN AN ORGANIZED HEALTH CARE EDUCATION/TRAINING PROGRAM

## 2023-10-25 ENCOUNTER — CLAIMS CREATED FROM THE CLAIM WINDOW (OUTPATIENT)
Dept: URBAN - METROPOLITAN AREA MEDICAL CENTER 25 | Facility: MEDICAL CENTER | Age: 68
End: 2023-10-25

## 2023-10-25 ENCOUNTER — CLAIMS CREATED FROM THE CLAIM WINDOW (OUTPATIENT)
Dept: URBAN - METROPOLITAN AREA MEDICAL CENTER 25 | Facility: MEDICAL CENTER | Age: 68
End: 2023-10-25
Payer: MEDICARE

## 2023-10-25 DIAGNOSIS — A04.72 C. DIFFICILE: ICD-10-CM

## 2023-10-25 DIAGNOSIS — K76.82 ACUTE HEPATIC ENCEPHALOPATHY: ICD-10-CM

## 2023-10-25 DIAGNOSIS — K70.31 ALCOHOLIC CIRRHOSIS OF LIVER WITH ASCITES: ICD-10-CM

## 2023-10-25 PROCEDURE — 99232 SBSQ HOSP IP/OBS MODERATE 35: CPT | Performed by: STUDENT IN AN ORGANIZED HEALTH CARE EDUCATION/TRAINING PROGRAM

## 2023-10-25 PROCEDURE — 99232 SBSQ HOSP IP/OBS MODERATE 35: CPT | Performed by: PHYSICIAN ASSISTANT

## 2023-10-26 ENCOUNTER — CLAIMS CREATED FROM THE CLAIM WINDOW (OUTPATIENT)
Dept: URBAN - METROPOLITAN AREA MEDICAL CENTER 25 | Facility: MEDICAL CENTER | Age: 68
End: 2023-10-26

## 2023-10-26 ENCOUNTER — CLAIMS CREATED FROM THE CLAIM WINDOW (OUTPATIENT)
Dept: URBAN - METROPOLITAN AREA MEDICAL CENTER 25 | Facility: MEDICAL CENTER | Age: 68
End: 2023-10-26
Payer: MEDICARE

## 2023-10-26 DIAGNOSIS — A04.72 C. DIFFICILE: ICD-10-CM

## 2023-10-26 DIAGNOSIS — K76.82 ACUTE HEPATIC ENCEPHALOPATHY: ICD-10-CM

## 2023-10-26 DIAGNOSIS — K70.30 ALC (ALCOHOLIC LIVER CIRRHOSIS): ICD-10-CM

## 2023-10-26 PROCEDURE — 99232 SBSQ HOSP IP/OBS MODERATE 35: CPT | Performed by: STUDENT IN AN ORGANIZED HEALTH CARE EDUCATION/TRAINING PROGRAM

## 2023-10-27 ENCOUNTER — CLAIMS CREATED FROM THE CLAIM WINDOW (OUTPATIENT)
Dept: URBAN - METROPOLITAN AREA MEDICAL CENTER 25 | Facility: MEDICAL CENTER | Age: 68
End: 2023-10-27

## 2023-10-27 ENCOUNTER — CLAIMS CREATED FROM THE CLAIM WINDOW (OUTPATIENT)
Dept: URBAN - METROPOLITAN AREA MEDICAL CENTER 25 | Facility: MEDICAL CENTER | Age: 68
End: 2023-10-27
Payer: MEDICARE

## 2023-10-27 DIAGNOSIS — A04.72 C. DIFFICILE: ICD-10-CM

## 2023-10-27 DIAGNOSIS — K76.82 ACUTE HEPATIC ENCEPHALOPATHY: ICD-10-CM

## 2023-10-27 DIAGNOSIS — K70.31 ALCOHOLIC CIRRHOSIS OF LIVER WITH ASCITES: ICD-10-CM

## 2023-10-27 PROCEDURE — 99233 SBSQ HOSP IP/OBS HIGH 50: CPT | Performed by: INTERNAL MEDICINE

## 2023-10-27 PROCEDURE — 99232 SBSQ HOSP IP/OBS MODERATE 35: CPT | Performed by: STUDENT IN AN ORGANIZED HEALTH CARE EDUCATION/TRAINING PROGRAM

## 2023-10-27 PROCEDURE — 99232 SBSQ HOSP IP/OBS MODERATE 35: CPT | Performed by: PHYSICIAN ASSISTANT

## 2023-11-03 ENCOUNTER — CLAIMS CREATED FROM THE CLAIM WINDOW (OUTPATIENT)
Dept: URBAN - METROPOLITAN AREA MEDICAL CENTER 25 | Facility: MEDICAL CENTER | Age: 68
End: 2023-11-03
Payer: MEDICARE

## 2023-11-03 DIAGNOSIS — A04.72 C. DIFFICILE: ICD-10-CM

## 2023-11-03 DIAGNOSIS — K76.82 ACUTE HEPATIC ENCEPHALOPATHY: ICD-10-CM

## 2023-11-03 DIAGNOSIS — K70.30 ALC (ALCOHOLIC LIVER CIRRHOSIS): ICD-10-CM

## 2023-11-03 DIAGNOSIS — K92.0 BLOODY EMESIS: ICD-10-CM

## 2023-11-03 PROCEDURE — 99233 SBSQ HOSP IP/OBS HIGH 50: CPT | Performed by: INTERNAL MEDICINE

## 2023-11-04 ENCOUNTER — CLAIMS CREATED FROM THE CLAIM WINDOW (OUTPATIENT)
Dept: URBAN - METROPOLITAN AREA MEDICAL CENTER 25 | Facility: MEDICAL CENTER | Age: 68
End: 2023-11-04
Payer: MEDICARE

## 2023-11-04 DIAGNOSIS — K92.2 ACUTE GASTROINTESTINAL BLEEDING: ICD-10-CM

## 2023-11-04 DIAGNOSIS — K31.89 ACHYLIA: ICD-10-CM

## 2023-11-04 DIAGNOSIS — K22.89 DILATATION OF ESOPHAGUS: ICD-10-CM

## 2023-11-04 PROCEDURE — 43235 EGD DIAGNOSTIC BRUSH WASH: CPT | Performed by: INTERNAL MEDICINE

## 2023-11-05 ENCOUNTER — CLAIMS CREATED FROM THE CLAIM WINDOW (OUTPATIENT)
Dept: URBAN - METROPOLITAN AREA MEDICAL CENTER 25 | Facility: MEDICAL CENTER | Age: 68
End: 2023-11-05
Payer: MEDICARE

## 2023-11-05 DIAGNOSIS — R11.2 ACUTE NAUSEA WITH NONBILIOUS VOMITING: ICD-10-CM

## 2023-11-05 DIAGNOSIS — K70.31 ALCOHOLIC CIRRHOSIS OF LIVER WITH ASCITES: ICD-10-CM

## 2023-11-05 DIAGNOSIS — A04.72 C. DIFFICILE: ICD-10-CM

## 2023-11-05 PROCEDURE — 99232 SBSQ HOSP IP/OBS MODERATE 35: CPT | Performed by: INTERNAL MEDICINE

## 2023-11-08 ENCOUNTER — CLAIMS CREATED FROM THE CLAIM WINDOW (OUTPATIENT)
Dept: URBAN - METROPOLITAN AREA MEDICAL CENTER 25 | Facility: MEDICAL CENTER | Age: 68
End: 2023-11-08
Payer: MEDICARE

## 2023-11-08 DIAGNOSIS — K92.0 BLOODY EMESIS: ICD-10-CM

## 2023-11-08 DIAGNOSIS — I86.4 BLEEDING GASTRIC VARICES: ICD-10-CM

## 2023-11-08 DIAGNOSIS — K74.60 ADVANCED CIRRHOSIS: ICD-10-CM

## 2023-11-08 DIAGNOSIS — K22.89 DILATATION OF ESOPHAGUS: ICD-10-CM

## 2023-11-08 PROCEDURE — 43235 EGD DIAGNOSTIC BRUSH WASH: CPT | Performed by: INTERNAL MEDICINE

## 2023-11-09 ENCOUNTER — CLAIMS CREATED FROM THE CLAIM WINDOW (OUTPATIENT)
Dept: URBAN - METROPOLITAN AREA MEDICAL CENTER 25 | Facility: MEDICAL CENTER | Age: 68
End: 2023-11-09
Payer: MEDICARE

## 2023-11-09 DIAGNOSIS — A04.72 C. DIFFICILE: ICD-10-CM

## 2023-11-09 DIAGNOSIS — K70.31 ALCOHOLIC CIRRHOSIS OF LIVER WITH ASCITES: ICD-10-CM

## 2023-11-09 DIAGNOSIS — K92.1 ACUTE MELENA: ICD-10-CM

## 2023-11-09 PROCEDURE — 99233 SBSQ HOSP IP/OBS HIGH 50: CPT | Performed by: INTERNAL MEDICINE

## 2023-11-15 ENCOUNTER — CLAIMS CREATED FROM THE CLAIM WINDOW (OUTPATIENT)
Dept: URBAN - METROPOLITAN AREA MEDICAL CENTER 25 | Facility: MEDICAL CENTER | Age: 68
End: 2023-11-15
Payer: MEDICARE

## 2023-11-15 DIAGNOSIS — D64.89 ANEMIA DUE TO OTHER CAUSE: ICD-10-CM

## 2023-11-15 DIAGNOSIS — K70.31 ALCOHOLIC CIRRHOSIS OF LIVER WITH ASCITES: ICD-10-CM

## 2023-11-15 DIAGNOSIS — R93.3 ABN FINDINGS-GI TRACT: ICD-10-CM

## 2023-11-15 PROCEDURE — G8427 DOCREV CUR MEDS BY ELIG CLIN: HCPCS | Performed by: STUDENT IN AN ORGANIZED HEALTH CARE EDUCATION/TRAINING PROGRAM

## 2023-11-15 PROCEDURE — 99254 IP/OBS CNSLTJ NEW/EST MOD 60: CPT | Performed by: STUDENT IN AN ORGANIZED HEALTH CARE EDUCATION/TRAINING PROGRAM

## 2023-11-15 PROCEDURE — 99222 1ST HOSP IP/OBS MODERATE 55: CPT | Performed by: STUDENT IN AN ORGANIZED HEALTH CARE EDUCATION/TRAINING PROGRAM

## 2023-12-15 ENCOUNTER — OFFICE VISIT (OUTPATIENT)
Dept: URBAN - METROPOLITAN AREA CLINIC 19 | Facility: CLINIC | Age: 68
End: 2023-12-15

## 2023-12-22 ENCOUNTER — OFFICE VISIT (OUTPATIENT)
Dept: URBAN - METROPOLITAN AREA MEDICAL CENTER 25 | Facility: MEDICAL CENTER | Age: 68
End: 2023-12-22

## 2024-01-09 ENCOUNTER — CLAIMS CREATED FROM THE CLAIM WINDOW (OUTPATIENT)
Dept: URBAN - METROPOLITAN AREA MEDICAL CENTER 25 | Facility: MEDICAL CENTER | Age: 69
End: 2024-01-09
Payer: MEDICARE

## 2024-01-09 DIAGNOSIS — K92.2 ACUTE GASTROINTESTINAL BLEEDING: ICD-10-CM

## 2024-01-09 DIAGNOSIS — K65.1 ABDOMINAL ABSCESS: ICD-10-CM

## 2024-01-09 DIAGNOSIS — D64.89 ANEMIA DUE TO OTHER CAUSE: ICD-10-CM

## 2024-01-09 DIAGNOSIS — K70.30 ALC (ALCOHOLIC LIVER CIRRHOSIS): ICD-10-CM

## 2024-01-09 PROCEDURE — G8427 DOCREV CUR MEDS BY ELIG CLIN: HCPCS | Performed by: INTERNAL MEDICINE

## 2024-01-09 PROCEDURE — 99222 1ST HOSP IP/OBS MODERATE 55: CPT | Performed by: INTERNAL MEDICINE

## 2024-01-09 PROCEDURE — 99254 IP/OBS CNSLTJ NEW/EST MOD 60: CPT | Performed by: INTERNAL MEDICINE

## 2024-01-10 ENCOUNTER — CLAIMS CREATED FROM THE CLAIM WINDOW (OUTPATIENT)
Dept: URBAN - METROPOLITAN AREA MEDICAL CENTER 25 | Facility: MEDICAL CENTER | Age: 69
End: 2024-01-10
Payer: MEDICARE

## 2024-01-10 DIAGNOSIS — D62 ABLA (ACUTE BLOOD LOSS ANEMIA): ICD-10-CM

## 2024-01-10 DIAGNOSIS — K31.89 ACHYLIA: ICD-10-CM

## 2024-01-10 DIAGNOSIS — K22.89 DILATATION OF ESOPHAGUS: ICD-10-CM

## 2024-01-10 PROCEDURE — 43235 EGD DIAGNOSTIC BRUSH WASH: CPT | Performed by: INTERNAL MEDICINE

## 2024-03-19 ENCOUNTER — LAB (OUTPATIENT)
Dept: URBAN - METROPOLITAN AREA MEDICAL CENTER 25 | Facility: MEDICAL CENTER | Age: 69
End: 2024-03-19
Payer: MEDICARE

## 2024-03-19 DIAGNOSIS — D64.89 NORMOCYTIC ANEMIA: ICD-10-CM

## 2024-03-19 DIAGNOSIS — R93.3 ABN FINDINGS-GI TRACT: ICD-10-CM

## 2024-03-19 DIAGNOSIS — K70.30 ALCOHOLIC CIRRHOSIS: ICD-10-CM

## 2024-03-19 PROCEDURE — 99254 IP/OBS CNSLTJ NEW/EST MOD 60: CPT | Performed by: INTERNAL MEDICINE

## 2024-03-19 PROCEDURE — 99222 1ST HOSP IP/OBS MODERATE 55: CPT | Performed by: INTERNAL MEDICINE

## 2024-03-19 PROCEDURE — G8427 DOCREV CUR MEDS BY ELIG CLIN: HCPCS | Performed by: INTERNAL MEDICINE

## 2024-03-20 ENCOUNTER — LAB (OUTPATIENT)
Dept: URBAN - METROPOLITAN AREA MEDICAL CENTER 25 | Facility: MEDICAL CENTER | Age: 69
End: 2024-03-20
Payer: MEDICARE

## 2024-03-20 DIAGNOSIS — D64.89 NORMOCYTIC ANEMIA: ICD-10-CM

## 2024-03-20 DIAGNOSIS — K70.30 ALCOHOLIC CIRRHOSIS: ICD-10-CM

## 2024-03-20 DIAGNOSIS — R41.82 ALTERED MENTAL STATUS: ICD-10-CM

## 2024-03-20 PROCEDURE — 99232 SBSQ HOSP IP/OBS MODERATE 35: CPT | Performed by: INTERNAL MEDICINE

## 2024-03-21 ENCOUNTER — LAB (OUTPATIENT)
Dept: URBAN - METROPOLITAN AREA MEDICAL CENTER 25 | Facility: MEDICAL CENTER | Age: 69
End: 2024-03-21
Payer: MEDICARE

## 2024-03-21 DIAGNOSIS — R41.82 ALTERED MENTAL STATUS, UNSPECIFIED ALTERED MENTAL STATUS TYPE: ICD-10-CM

## 2024-03-21 DIAGNOSIS — D64.89 ANEMIA DUE TO OTHER CAUSE, NOT CLASSIFIED: ICD-10-CM

## 2024-03-21 DIAGNOSIS — K70.30 ALCOHOLIC CIRRHOSIS OF LIVER WITHOUT ASCITES: ICD-10-CM

## 2024-03-21 DIAGNOSIS — R10.84 GENERALIZED ABDOMINAL PAIN: ICD-10-CM

## 2024-03-21 PROCEDURE — 99232 SBSQ HOSP IP/OBS MODERATE 35: CPT

## 2024-03-22 ENCOUNTER — LAB (OUTPATIENT)
Dept: URBAN - METROPOLITAN AREA MEDICAL CENTER 25 | Facility: MEDICAL CENTER | Age: 69
End: 2024-03-22

## 2024-03-22 PROCEDURE — 43235 EGD DIAGNOSTIC BRUSH WASH: CPT | Performed by: INTERNAL MEDICINE

## 2024-03-22 PROCEDURE — 45380 COLONOSCOPY AND BIOPSY: CPT | Performed by: INTERNAL MEDICINE

## 2024-04-12 ENCOUNTER — LAB (OUTPATIENT)
Dept: URBAN - METROPOLITAN AREA MEDICAL CENTER 25 | Facility: MEDICAL CENTER | Age: 69
End: 2024-04-12
Payer: MEDICARE

## 2024-04-12 DIAGNOSIS — R41.82 ALTERED MENTAL STATUS: ICD-10-CM

## 2024-04-12 DIAGNOSIS — D64.89 NORMOCYTIC ANEMIA: ICD-10-CM

## 2024-04-12 DIAGNOSIS — K70.30 ALCOHOLIC CIRRHOSIS: ICD-10-CM

## 2024-04-12 PROCEDURE — 99222 1ST HOSP IP/OBS MODERATE 55: CPT | Performed by: INTERNAL MEDICINE

## 2024-04-12 PROCEDURE — 99254 IP/OBS CNSLTJ NEW/EST MOD 60: CPT | Performed by: INTERNAL MEDICINE

## 2024-04-12 PROCEDURE — G8427 DOCREV CUR MEDS BY ELIG CLIN: HCPCS | Performed by: INTERNAL MEDICINE

## 2024-04-13 ENCOUNTER — LAB (OUTPATIENT)
Dept: URBAN - METROPOLITAN AREA MEDICAL CENTER 25 | Facility: MEDICAL CENTER | Age: 69
End: 2024-04-13
Payer: MEDICARE

## 2024-04-13 DIAGNOSIS — K70.31 ALCOHOLIC CIRRHOSIS OF LIVER WITH ASCITES: ICD-10-CM

## 2024-04-13 DIAGNOSIS — K76.82 HEPATIC ENCEPHALOPATHY: ICD-10-CM

## 2024-04-13 DIAGNOSIS — D64.89 NORMOCYTIC ANEMIA: ICD-10-CM

## 2024-04-13 PROCEDURE — 99232 SBSQ HOSP IP/OBS MODERATE 35: CPT | Performed by: INTERNAL MEDICINE

## 2024-05-16 ENCOUNTER — DASHBOARD ENCOUNTERS (OUTPATIENT)
Age: 69
End: 2024-05-16

## 2024-05-16 ENCOUNTER — TELEPHONE ENCOUNTER (OUTPATIENT)
Dept: URBAN - METROPOLITAN AREA CLINIC 19 | Facility: CLINIC | Age: 69
End: 2024-05-16

## 2024-05-16 ENCOUNTER — OFFICE VISIT (OUTPATIENT)
Dept: URBAN - METROPOLITAN AREA CLINIC 19 | Facility: CLINIC | Age: 69
End: 2024-05-16
Payer: MEDICARE

## 2024-05-16 VITALS
SYSTOLIC BLOOD PRESSURE: 134 MMHG | TEMPERATURE: 98 F | DIASTOLIC BLOOD PRESSURE: 72 MMHG | HEIGHT: 59 IN | WEIGHT: 118 LBS | BODY MASS INDEX: 23.79 KG/M2 | HEART RATE: 96 BPM

## 2024-05-16 DIAGNOSIS — I85.00 ESOPHAGEAL VARICES: ICD-10-CM

## 2024-05-16 DIAGNOSIS — K76.82 HEPATIC ENCEPHALOPATHY: ICD-10-CM

## 2024-05-16 DIAGNOSIS — K70.30 ALCOHOLIC CIRRHOSIS: ICD-10-CM

## 2024-05-16 DIAGNOSIS — J44.1 COPD EXACERBATION: ICD-10-CM

## 2024-05-16 DIAGNOSIS — R18.8 ASCITES: ICD-10-CM

## 2024-05-16 PROCEDURE — 99214 OFFICE O/P EST MOD 30 MIN: CPT | Performed by: INTERNAL MEDICINE

## 2024-05-16 RX ORDER — GABAPENTIN 600 MG/1
1 TABLET TABLET, FILM COATED ORAL ONCE A DAY
COMMUNITY

## 2024-05-16 RX ORDER — RIFAXIMIN 550 MG/1
1 TABLET TABLET ORAL TWICE A DAY
Qty: 180 TABLET | Refills: 1 | OUTPATIENT
Start: 2024-05-16 | End: 2024-11-11

## 2024-05-16 RX ORDER — ONDANSETRON 4 MG/1
1 TABLET ON THE TONGUE AND ALLOW TO DISSOLVE ORALLY TABLET, ORALLY DISINTEGRATING ORAL
Qty: 40 TABLET | Refills: 3 | COMMUNITY

## 2024-05-16 RX ORDER — BENZTROPINE MESYLATE 1 MG/1
1 TABLET TABLET ORAL ONCE A DAY
COMMUNITY

## 2024-05-16 RX ORDER — HYDROXYZINE HYDROCHLORIDE 25 MG/1
1 TABLET AT BEDTIME AS NEEDED TABLET, FILM COATED ORAL ONCE A DAY
COMMUNITY

## 2024-05-16 RX ORDER — CETIRIZINE HYDROCHLORIDE 10 MG/1
1 TABLET TABLET, FILM COATED ORAL ONCE A DAY
COMMUNITY

## 2024-05-16 RX ORDER — ACETAMINOPHEN 500 MG/1
1 TABLET AS NEEDED TABLET ORAL
COMMUNITY

## 2024-05-16 RX ORDER — MONTELUKAST 10 MG/1
1 TABLET TABLET, FILM COATED ORAL ONCE A DAY
COMMUNITY

## 2024-05-16 RX ORDER — FUROSEMIDE 40 MG/1
1 TABLET TABLET ORAL ONCE A DAY
COMMUNITY

## 2024-05-16 RX ORDER — ESCITALOPRAM OXALATE 5 MG/1
1 TABLET TABLET, FILM COATED ORAL ONCE A DAY
COMMUNITY

## 2024-05-16 RX ORDER — AMLODIPINE BESYLATE 5 MG/1
1 TABLET TABLET ORAL ONCE A DAY
COMMUNITY

## 2024-05-16 RX ORDER — LACTULOSE SOLUTION USP, 10 G/15 ML 10 G/15ML
30 ML SOLUTION ORAL; RECTAL TWICE A DAY
Qty: 5400 ML | Refills: 1 | OUTPATIENT
Start: 2024-05-16 | End: 2024-11-11

## 2024-05-21 LAB
A/G RATIO: 0.9
AFP, SERUM: 3.8
AFP-L3%, SERUM: 9.4
ALBUMIN: 2.9
ALKALINE PHOSPHATASE: 196
ALT (SGPT): 12
AST (SGOT): 20
BILIRUBIN, TOTAL: 1
BUN/CREATININE RATIO: (no result)
BUN: 16
CALCIUM: 8.9
CARBON DIOXIDE, TOTAL: 22
CHLORIDE: 107
CREATININE: 0.62
EGFR: 97
GLOBULIN, TOTAL: 3.4
GLUCOSE: 129
HEMATOCRIT: 26.1
HEMOGLOBIN: 7.9
INR: 1.2
MCH: 25.8
MCHC: 30.3
MCV: 85.3
MPV: 9.4
PLATELET COUNT: 142
POTASSIUM: 3.9
PROTEIN, TOTAL: 6.3
PT: 12.1
RDW: 15.8
RED BLOOD CELL COUNT: 3.06
SODIUM: 138
WHITE BLOOD CELL COUNT: 4.9

## 2024-05-24 ENCOUNTER — TELEPHONE ENCOUNTER (OUTPATIENT)
Dept: URBAN - METROPOLITAN AREA CLINIC 19 | Facility: CLINIC | Age: 69
End: 2024-05-24

## 2024-05-24 RX ORDER — RIFAXIMIN 550 MG/1
1 TABLET TABLET ORAL TWICE A DAY
Qty: 60 TABLET | Refills: 11
Start: 2024-05-16 | End: 2025-05-19

## 2024-06-28 ENCOUNTER — LAB OUTSIDE AN ENCOUNTER (OUTPATIENT)
Dept: URBAN - METROPOLITAN AREA CLINIC 111 | Facility: CLINIC | Age: 69
End: 2024-06-28

## 2024-06-28 ENCOUNTER — CLAIMS CREATED FROM THE CLAIM WINDOW (OUTPATIENT)
Dept: URBAN - METROPOLITAN AREA MEDICAL CENTER 25 | Facility: MEDICAL CENTER | Age: 69
End: 2024-06-28

## 2024-06-28 PROCEDURE — 99254 IP/OBS CNSLTJ NEW/EST MOD 60: CPT | Performed by: STUDENT IN AN ORGANIZED HEALTH CARE EDUCATION/TRAINING PROGRAM

## 2024-06-29 ENCOUNTER — CLAIMS CREATED FROM THE CLAIM WINDOW (OUTPATIENT)
Dept: URBAN - METROPOLITAN AREA MEDICAL CENTER 25 | Facility: MEDICAL CENTER | Age: 69
End: 2024-06-29

## 2024-06-29 PROCEDURE — 99232 SBSQ HOSP IP/OBS MODERATE 35: CPT | Performed by: STUDENT IN AN ORGANIZED HEALTH CARE EDUCATION/TRAINING PROGRAM

## 2024-08-21 ENCOUNTER — TELEPHONE ENCOUNTER (OUTPATIENT)
Dept: URBAN - METROPOLITAN AREA CLINIC 19 | Facility: CLINIC | Age: 69
End: 2024-08-21

## 2024-08-21 RX ORDER — PANTOPRAZOLE SODIUM 40 MG/1
1 TABLET TABLET, DELAYED RELEASE ORAL ONCE A DAY
Qty: 90 TABLET | Refills: 3
Start: 2022-12-02

## 2024-09-04 ENCOUNTER — OFFICE VISIT (OUTPATIENT)
Dept: URBAN - METROPOLITAN AREA CLINIC 19 | Facility: CLINIC | Age: 69
End: 2024-09-04

## 2024-09-04 NOTE — HPI-TODAY'S VISIT:
Ms. Laureano is a 69-year-old female who is here for follow-up.  She was last seen by Dr. Zambrano on 5/16/2024 for hospital follow-up where she was admitted with altered mental status and fluid overload.  Note: When she was seen by Dr. Desai she thought she was there for her gallbladder, and in the presence of gallstones he said she is in no condition for surgery and pain is in the lower abdomen and was mild.  He is again focused on her liver, cardiac, pulmonary issues.  Patient is still smoking. He started her on Xifaxan 550 twice daily with 1 refill and lactulose twice daily with 1 refill as well.  She was referred to pulmonologist Dr. Tera Cornell and encouraged to stop smoking.  Dr. Zambrano's comment on labs was: Low albumin, elevated alk phos, elevated PT/INR, and anemia.  None of these labs are much different from baseline with her known cirrhosis.  Previous procedures/notes Dx with alcoholic cirrhosis of liver with ascites, in 2016 via liver BX. has had frequent paracentesis, non compliant at times with diuretics, low NA diet and abstaining from alcohol. CT-guided liver biopsy performed on 8/29/16 that revealed fragments of benign liver with cirrhosis HX of GIB, esophageal varices and admission 2019: EGD on admission and found small esophageal varices and hematin. Taking furosemide 40/spironolactone 100 daily. CT scan was done on 11/23/22 - there were no significant changes from prior scans. She still has cirrhosis with splenomegaly (due to portal hypertension). No liver masses noted. MRI was done on 12/15/22 due to the elevated AFP - it showed no masses, but it did show cirrhotic changes with portal hypertension. EGD 7/8/22 grade 1 esophageal varices Paracentesis with 3.9 L removed in March. Modified barium swallow 4/28/2023 unremarkable video esophagram. Speech pathology portion recommended regular diet, medications mixing with applesauce and alternating bites and sips, needs partial/denture. No need for speech swallow therapy

## 2024-09-09 ENCOUNTER — LAB OUTSIDE AN ENCOUNTER (OUTPATIENT)
Dept: URBAN - METROPOLITAN AREA CLINIC 19 | Facility: CLINIC | Age: 69
End: 2024-09-09

## 2024-09-09 ENCOUNTER — OFFICE VISIT (OUTPATIENT)
Dept: URBAN - METROPOLITAN AREA CLINIC 19 | Facility: CLINIC | Age: 69
End: 2024-09-09
Payer: MEDICARE

## 2024-09-09 VITALS
BODY MASS INDEX: 26.21 KG/M2 | TEMPERATURE: 98.6 F | HEIGHT: 59 IN | HEART RATE: 91 BPM | DIASTOLIC BLOOD PRESSURE: 70 MMHG | WEIGHT: 130 LBS | OXYGEN SATURATION: 96 % | SYSTOLIC BLOOD PRESSURE: 140 MMHG

## 2024-09-09 DIAGNOSIS — L29.9 ITCHING: ICD-10-CM

## 2024-09-09 DIAGNOSIS — K21.9 GERD WITHOUT ESOPHAGITIS: ICD-10-CM

## 2024-09-09 DIAGNOSIS — R10.84 ABDOMINAL CRAMPING, GENERALIZED: ICD-10-CM

## 2024-09-09 DIAGNOSIS — K70.30 ALCOHOLIC CIRRHOSIS: ICD-10-CM

## 2024-09-09 PROCEDURE — 99213 OFFICE O/P EST LOW 20 MIN: CPT

## 2024-09-09 RX ORDER — ONDANSETRON 4 MG/1
1 TABLET ON THE TONGUE AND ALLOW TO DISSOLVE ORALLY TABLET, ORALLY DISINTEGRATING ORAL
Qty: 40 TABLET | Refills: 3 | Status: ACTIVE | COMMUNITY

## 2024-09-09 RX ORDER — BENZTROPINE MESYLATE 1 MG/1
1 TABLET TABLET ORAL ONCE A DAY
Status: ACTIVE | COMMUNITY

## 2024-09-09 RX ORDER — ACETAMINOPHEN 500 MG/1
1 TABLET AS NEEDED TABLET ORAL
Status: ACTIVE | COMMUNITY

## 2024-09-09 RX ORDER — GABAPENTIN 600 MG/1
1 TABLET TABLET, FILM COATED ORAL ONCE A DAY
Status: DISCONTINUED | COMMUNITY

## 2024-09-09 RX ORDER — MONTELUKAST 10 MG/1
1 TABLET TABLET, FILM COATED ORAL ONCE A DAY
Status: ACTIVE | COMMUNITY

## 2024-09-09 RX ORDER — CETIRIZINE HYDROCHLORIDE 10 MG/1
1 TABLET TABLET, FILM COATED ORAL ONCE A DAY
Status: ACTIVE | COMMUNITY

## 2024-09-09 RX ORDER — RIFAXIMIN 550 MG/1
1 TABLET TABLET ORAL TWICE A DAY
Qty: 60 TABLET | Refills: 3
Start: 2024-05-16 | End: 2025-01-06

## 2024-09-09 RX ORDER — RIFAXIMIN 550 MG/1
1 TABLET TABLET ORAL TWICE A DAY
Qty: 60 TABLET | Refills: 11 | Status: ACTIVE | COMMUNITY
Start: 2024-05-16 | End: 2025-05-19

## 2024-09-09 RX ORDER — HYDROXYZINE HYDROCHLORIDE 25 MG/1
1 TABLET AT BEDTIME AS NEEDED TABLET, FILM COATED ORAL ONCE A DAY
Status: ACTIVE | COMMUNITY

## 2024-09-09 RX ORDER — PANTOPRAZOLE SODIUM 40 MG/1
1 TABLET TABLET, DELAYED RELEASE ORAL ONCE A DAY
Qty: 90 TABLET | Refills: 3 | Status: ACTIVE | COMMUNITY
Start: 2022-12-02

## 2024-09-09 RX ORDER — LACTULOSE SOLUTION USP, 10 G/15 ML 10 G/15ML
30 ML SOLUTION ORAL; RECTAL TWICE A DAY
Qty: 5400 ML | Refills: 1 | Status: ACTIVE | COMMUNITY
Start: 2024-05-16 | End: 2024-11-11

## 2024-09-09 RX ORDER — ESCITALOPRAM OXALATE 5 MG/1
1 TABLET TABLET, FILM COATED ORAL ONCE A DAY
Status: ACTIVE | COMMUNITY

## 2024-09-09 RX ORDER — FUROSEMIDE 40 MG/1
1 TABLET TABLET ORAL ONCE A DAY
Status: ACTIVE | COMMUNITY

## 2024-09-09 RX ORDER — AMLODIPINE BESYLATE 5 MG/1
1 TABLET TABLET ORAL ONCE A DAY
Status: ACTIVE | COMMUNITY

## 2024-09-09 RX ORDER — LACTULOSE SOLUTION USP, 10 G/15 ML 10 G/15ML
30 ML SOLUTION ORAL; RECTAL THREE TIMES A DAY
Qty: 8100 ML | Refills: 3
Start: 2024-05-16 | End: 2025-09-04

## 2024-09-09 NOTE — HPI-TODAY'S VISIT:
Ms. Lisa is here for follow-up on her cirrhosis and medication refill.  She was last seen in GI clinic on 5/16/2024 by Dr. Singleton after hospital follow-up.  She was started on Xifaxan 550 mg twice a day and lactulose twice a day. Today she is accompanied by her niece. She notes LUQ and LLQ pain off and on. Says she has been itching a lot and hydroxyzine is not as effective.  Previous history summarized below: [facility] (4/24/24-4/28/24) for altered mental status and fluid overload. She was found to have chronic hypoxic respiratory failure in the setting of acute COPD and congestive heart failure on top of her cirrhosis. She was treated for her respiratory issues and given her medications for hepatic encephalopathy. 3/22/2024 EGD by Dr. Pollock - Normal esophagus, portal hypertensive gastropathy, chronic gastritis, normal duodenum.  No specimens collected. 3/22/2024 colonoscopy-preparation of the colon was fair, diverticulosis, exam was otherwise normal.  No specimens collected 9/18/2023 paracentesis: 7.7 L removed. 8/23/2023 paracentesis: 11.2 L removed.

## 2024-09-10 LAB
A/G RATIO: 1.2
AFP, SERUM, TUMOR MARKER: 5.1
ALBUMIN: 3.5
ALKALINE PHOSPHATASE: 130
ALT (SGPT): 12
AST (SGOT): 18
BILIRUBIN, TOTAL: 0.6
BUN/CREATININE RATIO: 17
BUN: 19
CALCIUM: 9.5
CARBON DIOXIDE, TOTAL: 18
CHLORIDE: 109
CREATININE: 1.1
EGFR: 55
GLOBULIN, TOTAL: 2.9
GLUCOSE: 119
POTASSIUM: 3.8
PROTEIN, TOTAL: 6.4
SODIUM: 138

## 2024-09-11 ENCOUNTER — ERX REFILL RESPONSE (OUTPATIENT)
Dept: URBAN - METROPOLITAN AREA CLINIC 19 | Facility: CLINIC | Age: 69
End: 2024-09-11

## 2024-09-11 RX ORDER — SPIRONOLACTONE 100 MG/1
TAKE ONE TABLET BY MOUTH EVERY DAY TABLET, FILM COATED ORAL
Qty: 30 TABLET | Refills: 11 | OUTPATIENT

## 2024-09-11 RX ORDER — SPIRONOLACTONE 100 MG/1
TAKE ONE TABLET BY MOUTH EVERY DAY TABLET, FILM COATED ORAL
Qty: 30 TABLET | Refills: 4 | OUTPATIENT

## 2024-09-23 ENCOUNTER — TELEPHONE ENCOUNTER (OUTPATIENT)
Dept: URBAN - METROPOLITAN AREA CLINIC 19 | Facility: CLINIC | Age: 69
End: 2024-09-23

## 2024-09-23 RX ORDER — AMOXICILLIN AND CLAVULANATE POTASSIUM 875; 125 MG/1; MG/1
1 TABLET TABLET, FILM COATED ORAL
Qty: 42 TABLET | Refills: 0 | OUTPATIENT
Start: 2024-09-23 | End: 2024-10-07

## 2024-11-06 ENCOUNTER — OFFICE VISIT (OUTPATIENT)
Dept: URBAN - METROPOLITAN AREA CLINIC 19 | Facility: CLINIC | Age: 69
End: 2024-11-06
Payer: MEDICARE

## 2024-11-06 VITALS
OXYGEN SATURATION: 98 % | HEIGHT: 59 IN | DIASTOLIC BLOOD PRESSURE: 70 MMHG | BODY MASS INDEX: 27.58 KG/M2 | WEIGHT: 136.8 LBS | TEMPERATURE: 99 F | SYSTOLIC BLOOD PRESSURE: 140 MMHG | HEART RATE: 72 BPM

## 2024-11-06 DIAGNOSIS — K70.30 ALCOHOLIC CIRRHOSIS: ICD-10-CM

## 2024-11-06 DIAGNOSIS — Z87.19 HISTORY OF DIVERTICULITIS: ICD-10-CM

## 2024-11-06 PROCEDURE — 99212 OFFICE O/P EST SF 10 MIN: CPT

## 2024-11-06 RX ORDER — HYDROXYZINE HYDROCHLORIDE 25 MG/1
1 TABLET AT BEDTIME AS NEEDED TABLET, FILM COATED ORAL ONCE A DAY
Status: ACTIVE | COMMUNITY

## 2024-11-06 RX ORDER — BENZTROPINE MESYLATE 1 MG/1
1 TABLET TABLET ORAL ONCE A DAY
Status: ACTIVE | COMMUNITY

## 2024-11-06 RX ORDER — CETIRIZINE HYDROCHLORIDE 10 MG/1
1 TABLET TABLET, FILM COATED ORAL ONCE A DAY
Status: ACTIVE | COMMUNITY

## 2024-11-06 RX ORDER — RIFAXIMIN 550 MG/1
1 TABLET TABLET ORAL TWICE A DAY
Qty: 60 TABLET | Refills: 3 | Status: ACTIVE | COMMUNITY
Start: 2024-05-16 | End: 2025-01-06

## 2024-11-06 RX ORDER — LACTULOSE SOLUTION USP, 10 G/15 ML 10 G/15ML
30 ML SOLUTION ORAL; RECTAL THREE TIMES A DAY
Qty: 8100 ML | Refills: 3 | Status: ACTIVE | COMMUNITY
Start: 2024-05-16 | End: 2025-09-04

## 2024-11-06 RX ORDER — SPIRONOLACTONE 100 MG/1
TAKE ONE TABLET BY MOUTH EVERY DAY TABLET, FILM COATED ORAL
Qty: 30 TABLET | Refills: 11 | Status: ACTIVE | COMMUNITY

## 2024-11-06 RX ORDER — ONDANSETRON 4 MG/1
1 TABLET ON THE TONGUE AND ALLOW TO DISSOLVE ORALLY TABLET, ORALLY DISINTEGRATING ORAL
Qty: 40 TABLET | Refills: 3 | Status: ACTIVE | COMMUNITY

## 2024-11-06 RX ORDER — ESCITALOPRAM OXALATE 5 MG/1
1 TABLET TABLET, FILM COATED ORAL ONCE A DAY
Status: ACTIVE | COMMUNITY

## 2024-11-06 RX ORDER — AMLODIPINE BESYLATE 5 MG/1
1 TABLET TABLET ORAL ONCE A DAY
Status: ACTIVE | COMMUNITY

## 2024-11-06 RX ORDER — ACETAMINOPHEN 500 MG/1
1 TABLET AS NEEDED TABLET ORAL
Status: ACTIVE | COMMUNITY

## 2024-11-06 RX ORDER — PANTOPRAZOLE SODIUM 40 MG/1
1 TABLET TABLET, DELAYED RELEASE ORAL ONCE A DAY
Qty: 90 TABLET | Refills: 3 | Status: ACTIVE | COMMUNITY
Start: 2022-12-02

## 2024-11-06 RX ORDER — MONTELUKAST 10 MG/1
1 TABLET TABLET, FILM COATED ORAL ONCE A DAY
Status: ACTIVE | COMMUNITY

## 2024-11-06 RX ORDER — FUROSEMIDE 40 MG/1
1 TABLET TABLET ORAL ONCE A DAY
Status: ACTIVE | COMMUNITY

## 2024-11-06 NOTE — HPI-TODAY'S VISIT:
Ms. Lisa is a 69-year-old female with PMH of anxiety/depression, Alzheimer's disease, hiatal hernia, alcoholic cirrhosis presents today for reevaluation of her abdominal pain.  Last seen 9/9/2024 and recommended a CT A/P with contrast that revealed findings most consistent with mild acute uncomplicated diverticulitis of the proximal sigmoid colon.  Cirrhotic liver.  TIPS shunt well-positioned.  Abdominal varices, splenomegaly, scant perihepatic and perisplenic varices.  Nonobstructive upper pole right renal calculus.  Indeterminate right renal 2.  4 cm probable proteinaceous cyst.   She was treated with Amoxicilln x 14 days and reports her abdominal pain has resolved. Her bowel movements are normal and daily.  Previous history summarized below: 9/10/2024 labs - MELD 3.0 : [facility] (4/24/24-4/28/24) for altered mental status and fluid overload. She was found to have chronic hypoxic respiratory failure in the setting of acute COPD and congestive heart failure on top of her cirrhosis. She was treated for her respiratory issues and given her medications for hepatic encephalopathy. 3/22/2024 EGD by Dr. Pollock - Normal esophagus, portal hypertensive gastropathy, chronic gastritis, normal duodenum. No specimens collected. 3/22/2024 colonoscopy-preparation of the colon was fair, diverticulosis, exam was otherwise normal. No specimens collected 9/18/2023 paracentesis: 7.7 L removed. 8/23/2023 paracentesis: 11.2 L removed.

## 2024-11-17 ENCOUNTER — ERX REFILL RESPONSE (OUTPATIENT)
Dept: URBAN - METROPOLITAN AREA CLINIC 19 | Facility: CLINIC | Age: 69
End: 2024-11-17

## 2024-11-17 RX ORDER — LACTULOSE 10 G/15ML
TAKE 30 ML TWICE DAILY SOLUTION ORAL
Qty: 5400 MILLILITER | Refills: 3 | OUTPATIENT

## 2024-11-17 RX ORDER — LACTULOSE SOLUTION USP, 10 G/15 ML 10 G/15ML
30 ML SOLUTION ORAL; RECTAL THREE TIMES A DAY
Qty: 8100 ML | Refills: 3 | OUTPATIENT

## 2025-04-16 ENCOUNTER — LAB OUTSIDE AN ENCOUNTER (OUTPATIENT)
Dept: URBAN - METROPOLITAN AREA CLINIC 111 | Facility: CLINIC | Age: 70
End: 2025-04-16

## 2025-04-16 ENCOUNTER — CLAIMS CREATED FROM THE CLAIM WINDOW (OUTPATIENT)
Dept: URBAN - METROPOLITAN AREA MEDICAL CENTER 25 | Facility: MEDICAL CENTER | Age: 70
End: 2025-04-16

## 2025-04-16 PROCEDURE — 99254 IP/OBS CNSLTJ NEW/EST MOD 60: CPT | Performed by: INTERNAL MEDICINE

## 2025-04-17 ENCOUNTER — CLAIMS CREATED FROM THE CLAIM WINDOW (OUTPATIENT)
Dept: URBAN - METROPOLITAN AREA MEDICAL CENTER 25 | Facility: MEDICAL CENTER | Age: 70
End: 2025-04-17

## 2025-04-17 ENCOUNTER — LAB OUTSIDE AN ENCOUNTER (OUTPATIENT)
Dept: URBAN - METROPOLITAN AREA CLINIC 111 | Facility: CLINIC | Age: 70
End: 2025-04-17

## 2025-04-17 PROCEDURE — 99232 SBSQ HOSP IP/OBS MODERATE 35: CPT | Performed by: INTERNAL MEDICINE

## 2025-05-07 ENCOUNTER — OFFICE VISIT (OUTPATIENT)
Dept: URBAN - METROPOLITAN AREA CLINIC 19 | Facility: CLINIC | Age: 70
End: 2025-05-07

## 2025-05-21 ENCOUNTER — OFFICE VISIT (OUTPATIENT)
Dept: URBAN - METROPOLITAN AREA CLINIC 19 | Facility: CLINIC | Age: 70
End: 2025-05-21

## 2025-06-05 ENCOUNTER — TELEPHONE ENCOUNTER (OUTPATIENT)
Dept: URBAN - METROPOLITAN AREA CLINIC 19 | Facility: CLINIC | Age: 70
End: 2025-06-05

## 2025-06-06 ENCOUNTER — OFFICE VISIT (OUTPATIENT)
Dept: URBAN - METROPOLITAN AREA CLINIC 19 | Facility: CLINIC | Age: 70
End: 2025-06-06

## 2025-07-10 ENCOUNTER — OFFICE VISIT (OUTPATIENT)
Dept: URBAN - METROPOLITAN AREA CLINIC 19 | Facility: CLINIC | Age: 70
End: 2025-07-10

## 2025-08-14 ENCOUNTER — OFFICE VISIT (OUTPATIENT)
Dept: URBAN - METROPOLITAN AREA CLINIC 19 | Facility: CLINIC | Age: 70
End: 2025-08-14

## 2025-08-14 RX ORDER — ACETAMINOPHEN 500 MG/1
1 TABLET AS NEEDED TABLET ORAL
COMMUNITY

## 2025-08-14 RX ORDER — MONTELUKAST 10 MG/1
1 TABLET TABLET, FILM COATED ORAL ONCE A DAY
COMMUNITY

## 2025-08-14 RX ORDER — ONDANSETRON 4 MG/1
1 TABLET ON THE TONGUE AND ALLOW TO DISSOLVE ORALLY TABLET, ORALLY DISINTEGRATING ORAL
Qty: 40 TABLET | Refills: 3 | COMMUNITY

## 2025-08-14 RX ORDER — LACTULOSE 10 G/15ML
TAKE 30 ML TWICE DAILY SOLUTION ORAL
Qty: 5400 MILLILITER | Refills: 3 | COMMUNITY

## 2025-08-14 RX ORDER — CETIRIZINE HYDROCHLORIDE 10 MG/1
1 TABLET TABLET, FILM COATED ORAL ONCE A DAY
COMMUNITY

## 2025-08-14 RX ORDER — HYDROXYZINE HYDROCHLORIDE 25 MG/1
1 TABLET AT BEDTIME AS NEEDED TABLET, FILM COATED ORAL ONCE A DAY
COMMUNITY

## 2025-08-14 RX ORDER — BENZTROPINE MESYLATE 1 MG/1
1 TABLET TABLET ORAL ONCE A DAY
COMMUNITY

## 2025-08-14 RX ORDER — ESCITALOPRAM OXALATE 5 MG/1
1 TABLET TABLET, FILM COATED ORAL ONCE A DAY
COMMUNITY

## 2025-08-14 RX ORDER — AMLODIPINE BESYLATE 5 MG/1
1 TABLET TABLET ORAL ONCE A DAY
COMMUNITY

## 2025-08-14 RX ORDER — PANTOPRAZOLE SODIUM 40 MG/1
1 TABLET TABLET, DELAYED RELEASE ORAL ONCE A DAY
Qty: 90 TABLET | Refills: 3 | COMMUNITY
Start: 2022-12-02

## 2025-08-14 RX ORDER — FUROSEMIDE 40 MG/1
1 TABLET TABLET ORAL ONCE A DAY
COMMUNITY

## 2025-08-14 RX ORDER — SPIRONOLACTONE 100 MG/1
TAKE ONE TABLET BY MOUTH EVERY DAY TABLET, FILM COATED ORAL
Qty: 30 TABLET | Refills: 11 | COMMUNITY